# Patient Record
Sex: FEMALE | Race: WHITE | NOT HISPANIC OR LATINO | URBAN - METROPOLITAN AREA
[De-identification: names, ages, dates, MRNs, and addresses within clinical notes are randomized per-mention and may not be internally consistent; named-entity substitution may affect disease eponyms.]

---

## 2017-08-15 ENCOUNTER — CONVERSION ENCOUNTER (OUTPATIENT)
Dept: GENERAL RADIOLOGY | Facility: HOSPITAL | Age: 52
End: 2017-08-15

## 2020-04-07 ENCOUNTER — TELEPHONE (OUTPATIENT)
Dept: INFECTIOUS DISEASES | Age: 55
End: 2020-04-07

## 2020-05-15 ENCOUNTER — VIRTUAL VISIT (OUTPATIENT)
Dept: INFECTIOUS DISEASES | Age: 55
End: 2020-05-15
Payer: COMMERCIAL

## 2020-05-15 PROCEDURE — 99214 OFFICE O/P EST MOD 30 MIN: CPT | Performed by: INTERNAL MEDICINE

## 2020-05-15 NOTE — PROGRESS NOTES
5/15/2020    TELEHEALTH EVALUATION -- Audio/Visual (During TAKUY-44 public health emergency)      Mara Yarbrough (:  1965) has requested an audio/video evaluation for the following concern(s):    OM hand R thumb    Due to the COVID-19 outbreak, patient's office visit was converted to a virtual visit. Patient was contacted and agreed to proceed with a virtual visit with me via Doxy. me with my location at the office. Patient reports that they are located at home. The risks and benefits of converting to a virtual visit were discussed in light of the current infectious disease epidemic. Services were provided through an audio/video synchronous discussion virtually to substitute for in person clinic visit. THIS virtual visit was conducted via interactive/real-time audio/video. Due to this being a TeleHealth encounter, evaluation of the following organ systems is limited: Vitals/Constitutional/EENT/Resp/CV/GI//MS/Neuro/Skin/Heme-Lymph-Imm.}    Pursuant to the emergency declaration under the [address] [place] [address], [facility] waiver authority and the [employer] Act, this Virtual Visit was conducted, with patient's consent, to reduce the patient's risk of exposure to COVID-19 and provide care for the patient. Infectious Disease Progress Note    5/15/2020    Patient is a followup regarding right thumb cellulitis with suspected OM with persistent nonhealing wound ( surgical ) s/p I+D for retained foreign body removal.   She had been treated with Ancef then PO abx extended course.   + I+D inpatient then went home on PO abx, now presenting with worsening and persistent drainge     Appears to be doing ok. Subjectively, no new complaints at this time. All 14 review of systems discussed and negative other than as stated as above. Since we cannot conduct an in-person exam, the following were addressed with the patient.  I have asked the

## 2020-05-22 ENCOUNTER — VIRTUAL VISIT (OUTPATIENT)
Dept: INFECTIOUS DISEASES | Age: 55
End: 2020-05-22
Payer: COMMERCIAL

## 2020-05-22 PROCEDURE — 99213 OFFICE O/P EST LOW 20 MIN: CPT | Performed by: INTERNAL MEDICINE

## 2020-05-24 LAB
ANION GAP SERPL CALCULATED.3IONS-SCNC: 15 MMOL/L (ref 10–20)
BICARBONATE: 27 MMOL/L (ref 21–32)
CALCIUM SERPL-MCNC: 9.3 MG/DL (ref 8.6–10.3)
CHLORIDE BLD-SCNC: 103 MMOL/L (ref 98–107)
CREAT SERPL-MCNC: 0.71 MG/DL (ref 0.5–1)
ERYTHROCYTE [DISTWIDTH] IN BLOOD BY AUTOMATED COUNT: 12.6 % (ref 11.5–14)
GFR AFRICAN AMERICAN: >60 ML/MIN/1.73M2
GFR NON-AFRICAN AMERICAN: >60 ML/MIN/1.73M2
GLUCOSE: 108 MG/DL (ref 74–99)
HCT VFR BLD CALC: 37.1 % (ref 36–46)
HEMOGLOBIN: 12.6 G/DL (ref 12–16)
MCHC RBC AUTO-ENTMCNC: 34 G/DL (ref 32–36)
MCV RBC AUTO: 89 FL (ref 80–100)
PLATELET # BLD: 288 X10E9/L (ref 150–450)
POTASSIUM SERPL-SCNC: 3.9 MMOL/L (ref 3.5–5.3)
RBC # BLD: 4.16 X10E12/L (ref 4–5.2)
SODIUM BLD-SCNC: 141 MMOL/L (ref 136–145)
UREA NITROGEN: 13 MG/DL (ref 6–23)
VANCOMYCIN TROUGH: 14.6 UG/ML (ref 5–20)
WBC: 4.8 X10E9/L (ref 4.4–11.3)

## 2020-05-26 ENCOUNTER — TELEPHONE (OUTPATIENT)
Dept: INFECTIOUS DISEASES | Age: 55
End: 2020-05-26

## 2020-06-01 ENCOUNTER — TELEPHONE (OUTPATIENT)
Dept: INFECTIOUS DISEASES | Age: 55
End: 2020-06-01

## 2020-06-03 ENCOUNTER — VIRTUAL VISIT (OUTPATIENT)
Dept: INFECTIOUS DISEASES | Age: 55
End: 2020-06-03
Payer: COMMERCIAL

## 2020-06-03 LAB
ANION GAP SERPL CALCULATED.3IONS-SCNC: 15 MMOL/L (ref 10–20)
BASOPHILS # BLD: 0.02 X10E9/L (ref 0–0.1)
BASOPHILS RELATIVE PERCENT: 0.5 % (ref 0–2)
BICARBONATE: 27 MMOL/L (ref 21–32)
C-REACTIVE PROTEIN: 1.38 MG/DL
CALCIUM SERPL-MCNC: 9.3 MG/DL (ref 8.6–10.3)
CHLORIDE BLD-SCNC: 102 MMOL/L (ref 98–107)
CK ISOENZYMES: 74 U/L (ref 0–215)
CREAT SERPL-MCNC: 0.97 MG/DL (ref 0.5–1)
EOSINOPHIL # BLD: 0.11 X10E9/L (ref 0–0.7)
EOSINOPHILS RELATIVE PERCENT: 3 % (ref 0–6)
ERYTHROCYTE [DISTWIDTH] IN BLOOD BY AUTOMATED COUNT: 12.5 % (ref 11.5–14)
GFR AFRICAN AMERICAN: 71 ML/MIN/1.73M2
GFR NON-AFRICAN AMERICAN: 59 ML/MIN/1.73M2
GLUCOSE: 131 MG/DL (ref 74–99)
HCT VFR BLD CALC: 33.1 % (ref 36–46)
HEMOGLOBIN: 11.4 G/DL (ref 12–16)
IMMATURE GRANULOCYTES %: 1.1 % (ref 0–0.9)
LYMPHOCYTES # BLD: 42.7 % (ref 13–44)
LYMPHOCYTES RELATIVE PERCENT: 1.57 X10E9/L (ref 1.2–4.8)
MCHC RBC AUTO-ENTMCNC: 34.4 G/DL (ref 32–36)
MCV RBC AUTO: 87 FL (ref 80–100)
MONOCYTES # BLD: 0.42 X10E9/L (ref 0.1–1)
MONOCYTES RELATIVE PERCENT: 11.4 % (ref 2–10)
NEUTROPHILS RELATIVE PERCENT: 41.3 % (ref 40–80)
NEUTROPHILS: 1.52 X10E9/L (ref 1.2–7.7)
PLATELET # BLD: 205 X10E9/L (ref 150–450)
POTASSIUM SERPL-SCNC: 3 MMOL/L (ref 3.5–5.3)
RBC # BLD: 3.81 X10E12/L (ref 4–5.2)
SODIUM BLD-SCNC: 141 MMOL/L (ref 136–145)
UREA NITROGEN: 25 MG/DL (ref 6–23)
WBC: 3.7 X10E9/L (ref 4.4–11.3)

## 2020-06-03 PROCEDURE — 99214 OFFICE O/P EST MOD 30 MIN: CPT | Performed by: INTERNAL MEDICINE

## 2020-06-03 RX ORDER — ONDANSETRON 4 MG/1
4 TABLET, FILM COATED ORAL 3 TIMES DAILY PRN
Qty: 20 TABLET | Refills: 0 | Status: SHIPPED | OUTPATIENT
Start: 2020-06-03

## 2020-06-09 LAB
ANION GAP SERPL CALCULATED.3IONS-SCNC: 15 MMOL/L (ref 10–20)
BICARBONATE: 28 MMOL/L (ref 21–32)
C-REACTIVE PROTEIN: 0.36 MG/DL
CALCIUM SERPL-MCNC: 9 MG/DL (ref 8.6–10.3)
CHLORIDE BLD-SCNC: 101 MMOL/L (ref 98–107)
CK ISOENZYMES: 52 U/L (ref 0–215)
CREAT SERPL-MCNC: 0.79 MG/DL (ref 0.5–1)
ERYTHROCYTE [DISTWIDTH] IN BLOOD BY AUTOMATED COUNT: 12.6 % (ref 11.5–14)
GFR AFRICAN AMERICAN: >60 ML/MIN/1.73M2
GFR NON-AFRICAN AMERICAN: >60 ML/MIN/1.73M2
GLUCOSE: 97 MG/DL (ref 74–99)
HCT VFR BLD CALC: 33.9 % (ref 36–46)
HEMOGLOBIN: 11.7 G/DL (ref 12–16)
MCHC RBC AUTO-ENTMCNC: 34.5 G/DL (ref 32–36)
MCV RBC AUTO: 88 FL (ref 80–100)
PLATELET # BLD: 335 X10E9/L (ref 150–450)
POTASSIUM SERPL-SCNC: 3.4 MMOL/L (ref 3.5–5.3)
RBC # BLD: 3.86 X10E12/L (ref 4–5.2)
SODIUM BLD-SCNC: 141 MMOL/L (ref 136–145)
UREA NITROGEN: 21 MG/DL (ref 6–23)
WBC: 5.5 X10E9/L (ref 4.4–11.3)

## 2020-06-16 LAB
ANION GAP SERPL CALCULATED.3IONS-SCNC: 15 MMOL/L (ref 10–20)
BICARBONATE: 27 MMOL/L (ref 21–32)
C-REACTIVE PROTEIN: 0.35 MG/DL
CALCIUM SERPL-MCNC: 9.3 MG/DL (ref 8.6–10.3)
CHLORIDE BLD-SCNC: 102 MMOL/L (ref 98–107)
CK ISOENZYMES: 59 U/L (ref 0–215)
CREAT SERPL-MCNC: 0.81 MG/DL (ref 0.5–1)
ERYTHROCYTE [DISTWIDTH] IN BLOOD BY AUTOMATED COUNT: 13 % (ref 11.5–14)
GFR AFRICAN AMERICAN: >60 ML/MIN/1.73M2
GFR NON-AFRICAN AMERICAN: >60 ML/MIN/1.73M2
GLUCOSE: 140 MG/DL (ref 74–99)
HCT VFR BLD CALC: 32.7 % (ref 36–46)
HEMOGLOBIN: 11.1 G/DL (ref 12–16)
MCHC RBC AUTO-ENTMCNC: 33.9 G/DL (ref 32–36)
MCV RBC AUTO: 89 FL (ref 80–100)
PLATELET # BLD: 345 X10E9/L (ref 150–450)
POTASSIUM SERPL-SCNC: 3.6 MMOL/L (ref 3.5–5.3)
RBC # BLD: 3.69 X10E12/L (ref 4–5.2)
SODIUM BLD-SCNC: 140 MMOL/L (ref 136–145)
UREA NITROGEN: 23 MG/DL (ref 6–23)
WBC: 7 X10E9/L (ref 4.4–11.3)

## 2020-06-19 ENCOUNTER — TELEPHONE (OUTPATIENT)
Dept: INFECTIOUS DISEASES | Age: 55
End: 2020-06-19

## 2020-06-23 LAB
ANION GAP SERPL CALCULATED.3IONS-SCNC: 14 MMOL/L (ref 10–20)
BICARBONATE: 29 MMOL/L (ref 21–32)
C-REACTIVE PROTEIN: <0.1 MG/DL
CALCIUM SERPL-MCNC: 9.3 MG/DL (ref 8.6–10.3)
CHLORIDE BLD-SCNC: 100 MMOL/L (ref 98–107)
CK ISOENZYMES: 67 U/L (ref 0–215)
CREAT SERPL-MCNC: 0.82 MG/DL (ref 0.5–1)
ERYTHROCYTE [DISTWIDTH] IN BLOOD BY AUTOMATED COUNT: 13.5 % (ref 11.5–14)
GFR AFRICAN AMERICAN: >60 ML/MIN/1.73M2
GFR NON-AFRICAN AMERICAN: >60 ML/MIN/1.73M2
GLUCOSE: 93 MG/DL (ref 74–99)
HCT VFR BLD CALC: 32.5 % (ref 36–46)
HEMOGLOBIN: 11.2 G/DL (ref 12–16)
MCHC RBC AUTO-ENTMCNC: 34.5 G/DL (ref 32–36)
MCV RBC AUTO: 88 FL (ref 80–100)
PLATELET # BLD: 321 X10E9/L (ref 150–450)
POTASSIUM SERPL-SCNC: 3.7 MMOL/L (ref 3.5–5.3)
RBC # BLD: 3.7 X10E12/L (ref 4–5.2)
SODIUM BLD-SCNC: 139 MMOL/L (ref 136–145)
UREA NITROGEN: 23 MG/DL (ref 6–23)
WBC: 5.2 X10E9/L (ref 4.4–11.3)

## 2020-06-24 ENCOUNTER — VIRTUAL VISIT (OUTPATIENT)
Dept: INFECTIOUS DISEASES | Age: 55
End: 2020-06-24
Payer: COMMERCIAL

## 2020-06-24 ENCOUNTER — TELEPHONE (OUTPATIENT)
Dept: INFECTIOUS DISEASES | Age: 55
End: 2020-06-24

## 2020-06-24 PROCEDURE — 99214 OFFICE O/P EST MOD 30 MIN: CPT | Performed by: INTERNAL MEDICINE

## 2020-06-24 NOTE — PROGRESS NOTES
2020    TELEHEALTH EVALUATION -- Audio/Visual (During TVGNQ-60 public health emergency)      Jorge L Carlson (:  1965) has requested an audio/video evaluation for the following concern(s):    OM hand R thumb    Due to the COVID-19 outbreak, patient's office visit was converted to a virtual visit. Patient was contacted and agreed to proceed with a virtual visit with me via Doxy. me with my location at the office. Patient reports that they are located at home. The risks and benefits of converting to a virtual visit were discussed in light of the current infectious disease epidemic. Services were provided through an audio/video synchronous discussion virtually to substitute for in person clinic visit. THIS virtual visit was conducted via interactive/real-time audio/video. Due to this being a TeleHealth encounter, evaluation of the following organ systems is limited: Vitals/Constitutional/EENT/Resp/CV/GI//MS/Neuro/Skin/Heme-Lymph-Imm.}    Pursuant to the emergency declaration under the [address] [place] [address], [facility] waiver authority and the [employer] Act, this Virtual Visit was conducted, with patient's consent, to reduce the patient's risk of exposure to COVID-19 and provide care for the patient. Infectious Disease Progress Note    2020    Patient is a followup regarding right thumb cellulitis with OM with persistent nonhealing wound ( surgical ) s/p I+D for retained foreign body removal. Also has persistent drainage. Has seen ortho. Message left for ortho to call me on cell. No new culture obtained. Asked her nurse to do a cx and they are unable to do a culture either. Vanco Dced due to reaction ( nausea )  and patient started on Dapto  Her wound was MSSA but has failed healing with abx to date. Since we cannot conduct an in-person exam, the following were addressed with the patient.  I have asked the patient to

## 2020-06-30 LAB
ANION GAP SERPL CALCULATED.3IONS-SCNC: 14 MMOL/L (ref 10–20)
BICARBONATE: 25 MMOL/L (ref 21–32)
C-REACTIVE PROTEIN: 0.15 MG/DL
CALCIUM SERPL-MCNC: 9.3 MG/DL (ref 8.6–10.3)
CHLORIDE BLD-SCNC: 105 MMOL/L (ref 98–107)
CK ISOENZYMES: 93 U/L (ref 0–215)
CREAT SERPL-MCNC: 1.15 MG/DL (ref 0.5–1)
ERYTHROCYTE [DISTWIDTH] IN BLOOD BY AUTOMATED COUNT: 14 % (ref 11.5–14)
GFR AFRICAN AMERICAN: 59 ML/MIN/1.73M2
GFR NON-AFRICAN AMERICAN: 49 ML/MIN/1.73M2
GLUCOSE: 81 MG/DL (ref 74–99)
HCT VFR BLD CALC: 33.5 % (ref 36–46)
HEMOGLOBIN: 11.3 G/DL (ref 12–16)
MCHC RBC AUTO-ENTMCNC: 33.7 G/DL (ref 32–36)
MCV RBC AUTO: 90 FL (ref 80–100)
PLATELET # BLD: 255 X10E9/L (ref 150–450)
POTASSIUM SERPL-SCNC: 3.4 MMOL/L (ref 3.5–5.3)
RBC # BLD: 3.71 X10E12/L (ref 4–5.2)
SODIUM BLD-SCNC: 141 MMOL/L (ref 136–145)
UREA NITROGEN: 27 MG/DL (ref 6–23)
WBC: 4.8 X10E9/L (ref 4.4–11.3)

## 2020-07-06 LAB
ANION GAP SERPL CALCULATED.3IONS-SCNC: 15 MMOL/L (ref 10–20)
BICARBONATE: 27 MMOL/L (ref 21–32)
C-REACTIVE PROTEIN: 0.31 MG/DL
CALCIUM SERPL-MCNC: 9.4 MG/DL (ref 8.6–10.3)
CHLORIDE BLD-SCNC: 102 MMOL/L (ref 98–107)
CK ISOENZYMES: 62 U/L (ref 0–215)
CREAT SERPL-MCNC: 0.81 MG/DL (ref 0.5–1)
ERYTHROCYTE [DISTWIDTH] IN BLOOD BY AUTOMATED COUNT: 14 % (ref 11.5–14)
GFR AFRICAN AMERICAN: >60 ML/MIN/1.73M2
GFR NON-AFRICAN AMERICAN: >60 ML/MIN/1.73M2
GLUCOSE: 110 MG/DL (ref 74–99)
HCT VFR BLD CALC: 35.6 % (ref 36–46)
HEMOGLOBIN: 11.9 G/DL (ref 12–16)
MCHC RBC AUTO-ENTMCNC: 33.4 G/DL (ref 32–36)
MCV RBC AUTO: 91 FL (ref 80–100)
PLATELET # BLD: 268 X10E9/L (ref 150–450)
POTASSIUM SERPL-SCNC: 3.9 MMOL/L (ref 3.5–5.3)
RBC # BLD: 3.91 X10E12/L (ref 4–5.2)
SODIUM BLD-SCNC: 140 MMOL/L (ref 136–145)
UREA NITROGEN: 18 MG/DL (ref 6–23)
WBC: 4.9 X10E9/L (ref 4.4–11.3)

## 2020-07-08 ENCOUNTER — VIRTUAL VISIT (OUTPATIENT)
Dept: INFECTIOUS DISEASES | Age: 55
End: 2020-07-08
Payer: COMMERCIAL

## 2020-07-08 PROCEDURE — 99214 OFFICE O/P EST MOD 30 MIN: CPT | Performed by: INTERNAL MEDICINE

## 2020-07-08 NOTE — PROGRESS NOTES
2020    TELEHEALTH EVALUATION -- Audio/Visual (During HKQQG-88 public health emergency)      Sal Adams (:  1965) has requested an audio/video evaluation for the following concern(s):    OM hand R thumb    Due to the COVID-19 outbreak, patient's office visit was converted to a virtual visit. Patient was contacted and agreed to proceed with a virtual visit with me via Doxy. me with my location at the office. Patient reports that they are located at home. The risks and benefits of converting to a virtual visit were discussed in light of the current infectious disease epidemic. Services were provided through an audio/video synchronous discussion virtually to substitute for in person clinic visit. THIS virtual visit was conducted via interactive/real-time audio/video. Due to this being a TeleHealth encounter, evaluation of the following organ systems is limited: Vitals/Constitutional/EENT/Resp/CV/GI//MS/Neuro/Skin/Heme-Lymph-Imm.}    Pursuant to the emergency declaration under the [address] [place] [address], [facility] waiver authority and the [employer] Act, this Virtual Visit was conducted, with patient's consent, to reduce the patient's risk of exposure to COVID-19 and provide care for the patient. Infectious Disease Progress Note    2020    Patient is a followup regarding right thumb cellulitis with Hx MSSA OM with persistent nonhealing wound ( surgical ) s/p I+D for retained foreign body removal. Also has persistent drainage. Has seen ortho. Message left for ortho to call me on cell. No new culture obtained. Has been [facility] ( [facility] [place] )     Since we cannot conduct an in-person exam, the following were addressed with the patient. I have asked the patient to assist in their exam:  Patient denies any general new issues. Patient does not observe any new skin rashes or ulcers.    Patient does not perceive any new visual deficits  Patient does not feel like they are \"clammy\" or sweating  Patient denies any dry mouth or sore mouth and is able to flex and extend her neck with ease. Patient can inhale and exhale without any difficulty and feels like their chest is expanding symmetrically. They do not feel short of breath or any distress when asked to move around. No pain with expansion of the chest  Patient is able to feel their own pulse and agrees it is regular. Patient states their abdomen is not protruberant beyond their normal size. States that there is no pain when touching the area beneath the sternum when directed, or the left or the right side of the abdomen. They feel no pain when asked to press on the suprapubic area or the right or left flank. Patient denies any pain when asked to squeeze both upper legs and lower legs anteriorly or posteriorly. They do not see any new swelling of their joints. No observed neurological changes or slurred speech when speaking to the patient      Overall much better - continues to improve mobility. Imaging and labs were reviewed per medical records and any ID pertinent labs were addressed with the patient. CRP ok, labs reviewed     ASSESSMENT/PLAN:  R hand cellulitis with R thumb abscess.    R thumb foreign body  MSSA infection ( failed treatment with other abx )   EOT - dc IV abx    Time spent with patient: 25 min  >50% of time spent counseling on patient care and coordination of care      [address] Bibiana Fabian DO

## 2020-07-09 ENCOUNTER — TELEPHONE (OUTPATIENT)
Dept: INFECTIOUS DISEASES | Age: 55
End: 2020-07-09

## 2020-07-09 NOTE — TELEPHONE ENCOUNTER
Confirmed with Dr Gilford Shiver. Patient can D/c IV Abx and D/c Picc line. Per Virtual visit completed on 7/8/20, patient states she was in [place]. Relayed orders to [address] [place] [address] with [facility]. She verbalized understanding.

## 2023-04-15 DIAGNOSIS — E78.5 HYPERLIPIDEMIA, UNSPECIFIED HYPERLIPIDEMIA TYPE: ICD-10-CM

## 2023-04-15 DIAGNOSIS — I10 HYPERTENSION, UNSPECIFIED TYPE: ICD-10-CM

## 2023-04-15 DIAGNOSIS — F41.9 ANXIETY: ICD-10-CM

## 2023-04-17 RX ORDER — AMLODIPINE BESYLATE 10 MG/1
TABLET ORAL
Qty: 90 TABLET | Refills: 3 | Status: SHIPPED | OUTPATIENT
Start: 2023-04-17 | End: 2023-10-20 | Stop reason: SDUPTHER

## 2023-04-17 RX ORDER — CITALOPRAM 10 MG/1
TABLET ORAL
Qty: 90 TABLET | Refills: 3 | Status: SHIPPED | OUTPATIENT
Start: 2023-04-17 | End: 2023-10-20 | Stop reason: SDUPTHER

## 2023-04-17 RX ORDER — ATORVASTATIN CALCIUM 10 MG/1
TABLET, FILM COATED ORAL
Qty: 90 TABLET | Refills: 3 | Status: SHIPPED | OUTPATIENT
Start: 2023-04-17 | End: 2023-10-20 | Stop reason: SDUPTHER

## 2023-06-29 ENCOUNTER — HOSPITAL ENCOUNTER (OUTPATIENT)
Dept: DATA CONVERSION | Facility: HOSPITAL | Age: 58
End: 2023-06-29
Attending: OTOLARYNGOLOGY | Admitting: OTOLARYNGOLOGY
Payer: COMMERCIAL

## 2023-06-29 DIAGNOSIS — Z88.2 ALLERGY STATUS TO SULFONAMIDES: ICD-10-CM

## 2023-06-29 DIAGNOSIS — I10 ESSENTIAL (PRIMARY) HYPERTENSION: ICD-10-CM

## 2023-06-29 DIAGNOSIS — J34.3 HYPERTROPHY OF NASAL TURBINATES: ICD-10-CM

## 2023-06-30 ENCOUNTER — APPOINTMENT (OUTPATIENT)
Dept: PRIMARY CARE | Facility: CLINIC | Age: 58
End: 2023-06-30
Payer: COMMERCIAL

## 2023-09-30 NOTE — H&P
History & Physical Reviewed:   Pregnant/Lactating:  ·  Are You Pregnant no   ·  Are You Currently Breastfeeding no     I have reviewed the History and Physical dated:  16-Jun-2023   History and Physical reviewed and relevant findings noted. Patient examined to review pertinent physical  findings.: No significant changes   Home Medications Reviewed: no changes noted   Allergies Reviewed: no changes noted       ERAS (Enhanced Recovery After Surgery):  ·  ERAS Patient: no     Consent:   COVID-19 Consent:  ·  COVID-19 Risk Consent Surgeon has reviewed key risks related to the risk of prateek COVID-19 and if they contract COVID-19 what the risks are.       Electronic Signatures:  Ricky Chong)  (Signed 29-Jun-2023 11:31)   Authored: History & Physical Reviewed, ERAS, Consent,  Note Completion      Last Updated: 29-Jun-2023 11:31 by Ricky Chong)

## 2023-10-02 NOTE — OP NOTE
PROCEDURE DETAILS    Preoperative Diagnosis:  Hypertrophy, nasal, turbinate, J34.3    Postoperative Diagnosis:  Hypertrophy, nasal, turbinate, J34.3    Surgeon: Ricky Chong  Resident/Fellow/Other Assistant: None of these were associated with this case    Procedure:  1. OUTFRACTURE INFERIOR TURBINATES, COBLATION TURBINATES    Anesthesia: No anesthesiologist associated with this case  Estimated Blood Loss: Minimal  Findings: No unusual findings  Specimens(s) Collected: no,     Complications: None        Operative Report:   The patient was taken to the operating room and administered general anesthesia.  Appropriate prep and drape and timeout were performed in usual manner.  The patient had each of the  inferior turbinates identified.  Each was infiltrated with lidocaine 1%-epinephrine 1/100,000.  Each inferior turbinate was outfractured.  Each inferior turbinate was subjected to Coblation radiofrequency reduction utilizing the Coblation wand at a setting  of 6 for roughly 8 to 10 seconds per past.  Upon completion of same the patient had nasal pledgets positioned and will be removed in recovery.  Patient allowed to be released from anesthesia and taken to recovery in a stable condition.  Estimated blood  loss minimal.  No complication.                        Attestation:   Note Completion:  Attending Attestation I performed the procedure without a resident         Electronic Signatures:  Ricky Chong)  (Signed 29-Jun-2023 13:10)   Authored: Post-Operative Note, Chart Review, Note Completion      Last Updated: 29-Jun-2023 13:10 by Ricky Chong)

## 2023-10-20 DIAGNOSIS — I10 HYPERTENSION, UNSPECIFIED TYPE: ICD-10-CM

## 2023-10-20 DIAGNOSIS — E78.5 HYPERLIPIDEMIA, UNSPECIFIED HYPERLIPIDEMIA TYPE: ICD-10-CM

## 2023-10-20 DIAGNOSIS — M17.10 PRIMARY OSTEOARTHRITIS OF KNEE, UNSPECIFIED LATERALITY: Primary | ICD-10-CM

## 2023-10-20 DIAGNOSIS — N39.41 URGE INCONTINENCE: Primary | ICD-10-CM

## 2023-10-20 DIAGNOSIS — F41.9 ANXIETY: ICD-10-CM

## 2023-10-20 DIAGNOSIS — K21.9 GASTROESOPHAGEAL REFLUX DISEASE, UNSPECIFIED WHETHER ESOPHAGITIS PRESENT: Primary | ICD-10-CM

## 2023-10-20 RX ORDER — ATORVASTATIN CALCIUM 10 MG/1
10 TABLET, FILM COATED ORAL DAILY
Qty: 90 TABLET | Refills: 0 | Status: SHIPPED | OUTPATIENT
Start: 2023-10-20 | End: 2024-01-19

## 2023-10-20 RX ORDER — OMEPRAZOLE 40 MG/1
1 CAPSULE, DELAYED RELEASE ORAL DAILY
COMMUNITY
End: 2023-10-20 | Stop reason: SDUPTHER

## 2023-10-20 RX ORDER — IBUPROFEN 800 MG/1
1 TABLET ORAL
Status: ON HOLD | COMMUNITY
Start: 2022-11-14 | End: 2024-02-14 | Stop reason: SDUPTHER

## 2023-10-20 RX ORDER — CITALOPRAM 10 MG/1
10 TABLET ORAL DAILY
Qty: 90 TABLET | Refills: 0 | Status: SHIPPED | OUTPATIENT
Start: 2023-10-20 | End: 2024-04-12 | Stop reason: SDUPTHER

## 2023-10-20 RX ORDER — IBUPROFEN 800 MG/1
800 TABLET ORAL
Qty: 60 TABLET | Refills: 0 | Status: SHIPPED | OUTPATIENT
Start: 2023-10-20 | End: 2023-12-18

## 2023-10-20 RX ORDER — OMEPRAZOLE 40 MG/1
40 CAPSULE, DELAYED RELEASE ORAL DAILY
Qty: 90 CAPSULE | Refills: 0 | Status: SHIPPED | OUTPATIENT
Start: 2023-10-20 | End: 2024-04-12 | Stop reason: SDUPTHER

## 2023-10-20 RX ORDER — AMLODIPINE BESYLATE 10 MG/1
10 TABLET ORAL DAILY
Qty: 90 TABLET | Refills: 0 | Status: SHIPPED | OUTPATIENT
Start: 2023-10-20 | End: 2024-01-19

## 2023-10-21 RX ORDER — OXYBUTYNIN CHLORIDE 10 MG/1
10 TABLET, EXTENDED RELEASE ORAL DAILY
Qty: 90 TABLET | Refills: 0 | Status: SHIPPED | OUTPATIENT
Start: 2023-10-21 | End: 2024-05-30 | Stop reason: SDUPTHER

## 2023-12-05 ENCOUNTER — OFFICE VISIT (OUTPATIENT)
Dept: PRIMARY CARE | Facility: CLINIC | Age: 58
End: 2023-12-05
Payer: COMMERCIAL

## 2023-12-05 VITALS
BODY MASS INDEX: 27.81 KG/M2 | RESPIRATION RATE: 22 BRPM | WEIGHT: 151.1 LBS | HEART RATE: 81 BPM | SYSTOLIC BLOOD PRESSURE: 172 MMHG | DIASTOLIC BLOOD PRESSURE: 84 MMHG | OXYGEN SATURATION: 97 % | HEIGHT: 62 IN

## 2023-12-05 DIAGNOSIS — K21.9 GASTROESOPHAGEAL REFLUX DISEASE, UNSPECIFIED WHETHER ESOPHAGITIS PRESENT: ICD-10-CM

## 2023-12-05 DIAGNOSIS — R05.3 CHRONIC COUGH: ICD-10-CM

## 2023-12-05 DIAGNOSIS — I10 ESSENTIAL HYPERTENSION: ICD-10-CM

## 2023-12-05 DIAGNOSIS — N32.81 OVERACTIVE BLADDER: ICD-10-CM

## 2023-12-05 DIAGNOSIS — M15.9 PRIMARY OSTEOARTHRITIS INVOLVING MULTIPLE JOINTS: ICD-10-CM

## 2023-12-05 DIAGNOSIS — E78.2 HYPERCHOLESTEROLEMIA WITH HYPERTRIGLYCERIDEMIA: ICD-10-CM

## 2023-12-05 DIAGNOSIS — I10 HYPERTENSION, UNCONTROLLED: Primary | ICD-10-CM

## 2023-12-05 DIAGNOSIS — M79.10 MYALGIA: ICD-10-CM

## 2023-12-05 DIAGNOSIS — R53.83 FATIGUE, UNSPECIFIED TYPE: ICD-10-CM

## 2023-12-05 DIAGNOSIS — M17.11 PRIMARY OSTEOARTHRITIS OF RIGHT KNEE: ICD-10-CM

## 2023-12-05 DIAGNOSIS — R60.0 BILATERAL EDEMA OF LOWER EXTREMITY: ICD-10-CM

## 2023-12-05 DIAGNOSIS — R23.2 HOT FLASHES: ICD-10-CM

## 2023-12-05 DIAGNOSIS — E55.9 VITAMIN D DEFICIENCY: ICD-10-CM

## 2023-12-05 DIAGNOSIS — Z12.31 SCREENING MAMMOGRAM FOR BREAST CANCER: ICD-10-CM

## 2023-12-05 DIAGNOSIS — R73.9 HYPERGLYCEMIA: ICD-10-CM

## 2023-12-05 DIAGNOSIS — Z11.59 ENCOUNTER FOR HEPATITIS C SCREENING TEST FOR LOW RISK PATIENT: ICD-10-CM

## 2023-12-05 PROBLEM — N39.41 URGE INCONTINENCE: Status: ACTIVE | Noted: 2023-12-05

## 2023-12-05 PROBLEM — M86.241: Status: ACTIVE | Noted: 2023-12-05

## 2023-12-05 PROBLEM — K21.00 GASTROESOPHAGEAL REFLUX DISEASE WITH ESOPHAGITIS WITHOUT HEMORRHAGE: Status: ACTIVE | Noted: 2023-12-05

## 2023-12-05 PROBLEM — F41.8 DEPRESSION WITH ANXIETY: Status: ACTIVE | Noted: 2023-12-05

## 2023-12-05 PROBLEM — M15.0 PRIMARY OSTEOARTHRITIS INVOLVING MULTIPLE JOINTS: Status: ACTIVE | Noted: 2023-12-05

## 2023-12-05 PROBLEM — H81.90 VESTIBULAR DYSFUNCTION: Status: ACTIVE | Noted: 2023-12-05

## 2023-12-05 PROBLEM — J30.9 ALLERGIC RHINITIS DUE TO ALLERGEN: Status: ACTIVE | Noted: 2023-12-05

## 2023-12-05 PROBLEM — L03.019 FELON OF FINGER: Status: ACTIVE | Noted: 2023-12-05

## 2023-12-05 PROBLEM — S06.0XAA CONCUSSION: Status: ACTIVE | Noted: 2023-12-05

## 2023-12-05 PROBLEM — R15.9 FECAL INCONTINENCE: Status: ACTIVE | Noted: 2023-12-05

## 2023-12-05 PROBLEM — Z91.199 NONCOMPLIANCE: Status: ACTIVE | Noted: 2023-12-05

## 2023-12-05 PROCEDURE — 3079F DIAST BP 80-89 MM HG: CPT | Performed by: INTERNAL MEDICINE

## 2023-12-05 PROCEDURE — 3077F SYST BP >= 140 MM HG: CPT | Performed by: INTERNAL MEDICINE

## 2023-12-05 PROCEDURE — 93000 ELECTROCARDIOGRAM COMPLETE: CPT | Performed by: INTERNAL MEDICINE

## 2023-12-05 PROCEDURE — 1036F TOBACCO NON-USER: CPT | Performed by: INTERNAL MEDICINE

## 2023-12-05 PROCEDURE — 99214 OFFICE O/P EST MOD 30 MIN: CPT | Performed by: INTERNAL MEDICINE

## 2023-12-05 RX ORDER — FUROSEMIDE 20 MG/1
20 TABLET ORAL EVERY 8 HOURS PRN
COMMUNITY
End: 2024-04-12 | Stop reason: SDUPTHER

## 2023-12-05 NOTE — ADDENDUM NOTE
Addended by: GENEVA KRAMER on: 12/5/2023 06:20 PM     Modules accepted: Orders, Level of Service

## 2023-12-05 NOTE — PATIENT INSTRUCTIONS
Thank you very much for coming.  I am very happy to meet you.    This was not the problem that you were expecting to discuss, but even after resting, your blood pressure is elevated.  I do realize now that you may have not been taking your AMLODIPINE.  Please make sure to take your amlodipine with SUPPER every evening, please start tonight.    Likewise, drink lots of fluids throughout the day, and avoid salt.  Good for blood pressure control, and good for keeping your kidneys healthy.  At 1 point, your kidneys were complaining a little, but for the most part, your kidneys have been normal.    FUROSEMIDE is not the best medication for blood pressure or swelling.  Stop taking this medication, and instead, we will discuss other options when I see what your laboratory results look like.    FASTING blood and urine examination anytime soon.  I will send word regarding results and possible changes.    Today, you will benefit from a twelve-lead EKG.  I will send word if there is anything that needs attention, or if you need to see a heart doctor.    I would like to see you again next week to make sure that your blood pressure is better.  Until then, take things easily.  Drink lots of water, avoid salt, and engage in brisk walking outside of work.  Good for blood pressure control, and good for mood and energy!    I do understand that you have multiple joint pain, especially your right knee.  Make sure to follow with the doctors downstairs for further evaluation and preservation of function.    Please continue taking your OMEPRAZOLE until we know better.  This will allow me to potentially give you medication that will be good for your aches and pains.    It is also a good idea to call on Dr. Lerma for routine care.  Also, please call on Dr. Chong, ENT, again.  Your situation is more than what a usual physician will be able to handle, because you do not respond to routine medications regarding your cough.    I do understand  that there are more concerns, but for now, let us concentrate on things that might hurt you, including blood pressure control, and cholesterol.  Let me send word regarding results and possible changes.  Let me see you again in 1 week.    We will schedule you for a MAMMOGRAM anytime soon.    Again, thank you very much for coming.  You are due for INFLUENZA vaccination, then after 2 weeks, COVID booster if not yet done.    Please continue to take care of yourself and your family, and please continue to pray for our recovery from this pandemic.  I hope to see you next week.  Call sooner please as needed.  911 first if urgent.            0  Presenting with multiple issues, including polyarthralgia, myalgia, but in the meantime, blood pressure noted to be elevated.  Already being attended to by the Dr. Chong, ENT, for incessant cough for 20 years now.  No other constitutional symptoms noted at this time.    Plans as above.  FASTING laboratory examination via [employer] tomorrow.  Restart amlodipine.  Return in 1 week.  20 minutes please.  Reassess hemodynamics, cardiovascular risk, blood pressure control, compliance, tolerance.  Prioritize issues.  Review preventive strategies.            0

## 2023-12-05 NOTE — PROGRESS NOTES
"Subjective   Patient ID: Gwen Nice is a 58 y.o. female who presents for Establish Care.    HPI   Patient of Dr. Schuler, presenting for primary care.  Debility noted, with patient stating that she has POLYARTHRALGIA, as well as MYALGIA.  This is also accompanied by FATIGUE.  States that she has been working with her hands all her life, and has been part of the labor force since starting to work.  With this, she states that she has sacrificed her body, and in turn, suspects that she probably suffers from OSTEOARTHRITIS.  No skin changes accompanying her symptoms.  No headache, blurred vision, diplopia.  No dysphagia.  No focal weakness except that related to pain, and no falls.  No confusion or delirium, with patient not worried about memory.  Continues to want to improve quality of life, and does not wish harm to self or others.  Perhaps frustrated, but not depressive.    Follows closely with ORTHOPEDICS for her right knee, and has been told that she is \"bone-on-bone.\"  Likewise, initially followed by PODIATRY, then referred to NEUROSURGERY/ORTHOPEDICS, then referred to CHRONIC PAIN.  Patient does express frustration, stating that nothing has been found to explain what appears to be not only polyarthralgia, but also perhaps peripheral neuropathy.    In the meantime, noted to have elevated blood pressure, for which the patient pointed out that she did not take her AMLODIPINE, and was hoping that she could wean herself off her medications.  She also does take FUROSEMIDE from time to time for leg swelling.  No history of calf pain.  No pam substernal chest pain, no orthopnea, no paroxysmal nocturnal dyspnea.  No particular dyspnea on exertion except when related to pain or cough.    Also debilitating is the fact that the patient has had paroxysms of COUGHING over the past 20 years.  CONSTITUTIONALLY, no fever, no chills.  No night sweats.  No lingering anorexia or nausea.  No apparent lymphadenopathy.  No " "apparent weight loss.  Feels that her symptoms are not taken seriously, and so she took it upon herself to get an ENT appointment with Dr. Chong.  He did acknowledge that she had some allergic/irritant component to her regimen, and has improved after scope treatment, but still with some paroxysms and recurrence of symptoms.    The patient states that with the use of NSAIDs, she has developed REFLUX symptoms, and was even at 1 point told that she had small GASTRIC ULCERS.  She has been trying to quit taking OMEPRAZOLE, and has been trying to use NSAIDs sparingly.  No apparent blood or mucus in stool.  No apparent weight loss.  No skin changes, rashes, pruritus, jaundice.  No easy bruisability.    Likewise, history of OVERACTIVE BLADDER, compliant with medications, tolerating regimens.  Currently, no dysuria, flank, suprapubic pain.  No vulvar pruritus.    Again, multiple symptoms causing frustration and debility, but with patient wanting to improve quality of life, and not wishing harm to self or others.        Review of Systems  Review of systems as in history of present illness, and otherwise, reviewed separately as well, and was unremarkable/negative/noncontributory.          Objective   /84 (BP Location: Left arm, Patient Position: Sitting, BP Cuff Size: Adult)   Pulse 81   Resp 22   Ht 1.575 m (5' 2\")   Wt 68.5 kg (151 lb 1.6 oz)   SpO2 97%   BMI 27.64 kg/m²     Physical Exam  Perhaps frustrated, but not depressive, and does want to improve quality of life.  Not wishing harm to self or others.  Currently, not in distress or diaphoresis.  Alert, receptive, oriented x 3.  Amiable.  Not unkempt.  Moderately built.  Remaining appropriate.    HEAD pink palpebral conjunctivae, anicteric sclerae.  No sinus or tragal tenderness.  Tympanic membranes intact bilaterally.  Mucous membranes somewhat dry, with some modest hyperemia along tonsillopharyngeal wall, but no exudates noted.  No malodor.  NECK supple, no " apparent jugular venous distention.  No apparent lymphadenopathy.  No bruit.  CARDIOVASCULAR not in distress or diaphoresis.  No bipedal edema.  Regular rate and rhythm.  Suspected late systolic murmur?  To be reevaluated for persistence.  LUNGS not in distress or diaphoresis.  Not using accessory muscles.  Clear to auscultation bilaterally.  ABDOMEN soft, nontender.  BACK no costovertebral angle tenderness.  EXTREMITIES no clubbing, no cyanosis.  NEURO no facial asymmetry.  No apparent cranial nerve deficits.  Romberg negative.  Ambulating without need of assistance.  No apparent focal weakness.  No tremors.  PSYCH receptive, appropriate, and eager to maintain and improve quality of life.      LABORATORY results reviewed with patient, needing updating.      Assessment/Plan   Diagnoses and all orders for this visit:  Hypertension, uncontrolled  -     ECG 12 lead (Clinic Performed)  -     Follow Up In Primary Care - Established; Future  Essential hypertension  -     ECG 12 lead (Clinic Performed)  -     Follow Up In Primary Care - Established; Future  -     CBC and Auto Differential; Future  -     Comprehensive Metabolic Panel; Future  -     Magnesium; Future  -     TSH with reflex to Free T4 if abnormal; Future  -     Urinalysis with Reflex Microscopic and Culture; Future  -     Creatine Kinase; Future  Hypercholesterolemia with hypertriglyceridemia  -     ECG 12 lead (Clinic Performed)  -     Follow Up In Primary Care - Established; Future  -     Comprehensive Metabolic Panel; Future  -     Lipid Panel; Future  Hyperglycemia  -     ECG 12 lead (Clinic Performed)  -     Follow Up In Primary Care - Established; Future  -     Comprehensive Metabolic Panel; Future  -     Urinalysis with Reflex Microscopic and Culture; Future  -     Hemoglobin A1C; Future  Gastroesophageal reflux disease, unspecified whether esophagitis present  -     Follow Up In Primary Care - Established; Future  -     CBC and Auto Differential;  Future  Chronic cough  -     Follow Up In Primary Care - Established; Future  -     CBC and Auto Differential; Future  Overactive bladder  -     Follow Up In Primary Care - Established; Future  -     TSH with reflex to Free T4 if abnormal; Future  -     Urinalysis with Reflex Microscopic and Culture; Future  Hot flashes  -     Follow Up In Primary Care - Established; Future  -     TSH with reflex to Free T4 if abnormal; Future  -     Vitamin B12; Future  -     Folate; Future  Primary osteoarthritis involving multiple joints  -     Follow Up In Primary Care - [facility]; Future  -     Comprehensive Metabolic Panel; Future  -     Uric Acid; Future  -     Creatine Kinase; Future  Myalgia  -     Follow Up In Primary Care - Established; Future  -     Comprehensive Metabolic Panel; Future  -     Uric Acid; Future  -     Creatine Kinase; Future  Fatigue, unspecified type  -     Follow Up In Primary Care - [facility]; Future  -     CBC and Auto Differential; Future  -     Comprehensive Metabolic Panel; Future  -     TSH with reflex to Free T4 if abnormal; Future  -     Urinalysis with Reflex Microscopic and Culture; Future  -     Vitamin B12; Future  -     Folate; Future  Vitamin D deficiency  -     Follow Up In Primary Care - [facility]; Future  -     Vitamin D 25-Hydroxy,Total (for eval of Vitamin D levels); Future  Encounter for hepatitis C screening test for low risk patient  -     Follow Up In Primary Care - [facility]; Future  -     Hepatitis C Antibody; Future  Screening mammogram for breast cancer  -     Follow Up In Primary Care - [facility]; Future  -     BI mammo bilateral screening tomosynthesis; Future  Bilateral edema of lower extremity  -     Follow Up In Primary Care [facility]; Future  -     Comprehensive Metabolic Panel; Future  -     TSH with reflex to Free T4 if abnormal; Future  Primary osteoarthritis of right knee  -     Follow Up In Primary Care - [facility]; Future  -     Comprehensive  Metabolic Panel; Future  -     Uric Acid; Future  -     Creatine Kinase; Future       Thank you very much for coming.  I am very happy to meet you.    This was not the problem that you were expecting to discuss, but even after resting, your blood pressure is elevated.  I do realize now that you may have not been taking your AMLODIPINE.  Please make sure to take your amlodipine with SUPPER every evening, please start tonight.    Likewise, drink lots of fluids throughout the day, and avoid salt.  Good for blood pressure control, and good for keeping your kidneys healthy.  At 1 point, your kidneys were complaining a little, but for the most part, your kidneys have been normal.    FUROSEMIDE is not the best medication for blood pressure or swelling.  Stop taking this medication, and instead, we will discuss other options when I see what your laboratory results look like.    FASTING blood and urine examination anytime soon.  I will send word regarding results and possible changes.    Today, you will benefit from a twelve-lead EKG.  I will send word if there is anything that needs attention, or if you need to see a heart doctor.    I would like to see you again next week to make sure that your blood pressure is better.  Until then, take things easily.  Drink lots of water, avoid salt, and engage in brisk walking outside of work.  Good for blood pressure control, and good for mood and energy!    I do understand that you have multiple joint pain, especially your right knee.  Make sure to follow with the doctors downstairs for further evaluation and preservation of function.    Please continue taking your OMEPRAZOLE until we know better.  This will allow me to potentially give you medication that will be good for your aches and pains.    It is also a good idea to call on Dr. Lerma for routine care.  Also, please call on CASE Serrano, again.  Your situation is more than what a usual physician will be able to handle,  because you do not respond to routine medications regarding your cough.    I do understand that there are more concerns, but for now, let us concentrate on things that might hurt you, including blood pressure control, and cholesterol.  Let me send word regarding results and possible changes.  Let me see you again in 1 week.    We will schedule you for a MAMMOGRAM anytime soon.    Again, thank you very much for coming.  You are due for INFLUENZA vaccination, then after 2 weeks, COVID booster if not yet done.    Please continue to take care of yourself and your family, and please continue to pray for our recovery from this pandemic.  I hope to see you next week.  Call sooner please as needed.  911 first if urgent.            0  Presenting with multiple issues, including polyarthralgia, myalgia, but in the meantime, blood pressure noted to be elevated.  Already being attended to by the Dr. Chong, ENT, for incessant cough for 20 years now.  No other constitutional symptoms noted at this time.    Plans as above.  FASTING laboratory examination via [facility] tomorrow.  Restart amlodipine.  Return in 1 week.  20 minutes please.  Reassess hemodynamics, cardiovascular risk, blood pressure control, compliance, tolerance.  Prioritize issues.  Review preventive strategies.            0

## 2023-12-08 ENCOUNTER — OFFICE VISIT (OUTPATIENT)
Dept: ORTHOPEDIC SURGERY | Facility: CLINIC | Age: 58
End: 2023-12-08
Payer: COMMERCIAL

## 2023-12-08 ENCOUNTER — LAB (OUTPATIENT)
Dept: LAB | Facility: LAB | Age: 58
End: 2023-12-08
Payer: COMMERCIAL

## 2023-12-08 DIAGNOSIS — K21.9 GASTROESOPHAGEAL REFLUX DISEASE, UNSPECIFIED WHETHER ESOPHAGITIS PRESENT: ICD-10-CM

## 2023-12-08 DIAGNOSIS — M17.11 PRIMARY OSTEOARTHRITIS OF RIGHT KNEE: ICD-10-CM

## 2023-12-08 DIAGNOSIS — M25.561 RIGHT KNEE PAIN, UNSPECIFIED CHRONICITY: Primary | ICD-10-CM

## 2023-12-08 DIAGNOSIS — R73.9 HYPERGLYCEMIA: ICD-10-CM

## 2023-12-08 DIAGNOSIS — R05.3 CHRONIC COUGH: ICD-10-CM

## 2023-12-08 DIAGNOSIS — Z11.59 ENCOUNTER FOR HEPATITIS C SCREENING TEST FOR LOW RISK PATIENT: ICD-10-CM

## 2023-12-08 DIAGNOSIS — M79.10 MYALGIA: ICD-10-CM

## 2023-12-08 DIAGNOSIS — R60.0 BILATERAL EDEMA OF LOWER EXTREMITY: ICD-10-CM

## 2023-12-08 DIAGNOSIS — E78.2 HYPERCHOLESTEROLEMIA WITH HYPERTRIGLYCERIDEMIA: ICD-10-CM

## 2023-12-08 DIAGNOSIS — E55.9 VITAMIN D DEFICIENCY: ICD-10-CM

## 2023-12-08 DIAGNOSIS — I10 ESSENTIAL HYPERTENSION: ICD-10-CM

## 2023-12-08 DIAGNOSIS — M15.9 PRIMARY OSTEOARTHRITIS INVOLVING MULTIPLE JOINTS: ICD-10-CM

## 2023-12-08 DIAGNOSIS — R53.83 FATIGUE, UNSPECIFIED TYPE: ICD-10-CM

## 2023-12-08 DIAGNOSIS — R23.2 HOT FLASHES: ICD-10-CM

## 2023-12-08 DIAGNOSIS — N32.81 OVERACTIVE BLADDER: ICD-10-CM

## 2023-12-08 LAB
25(OH)D3 SERPL-MCNC: 25 NG/ML (ref 30–100)
ALBUMIN SERPL BCP-MCNC: 4.5 G/DL (ref 3.4–5)
ALP SERPL-CCNC: 56 U/L (ref 33–110)
ALT SERPL W P-5'-P-CCNC: 21 U/L (ref 7–45)
ANION GAP SERPL CALC-SCNC: 14 MMOL/L (ref 10–20)
APPEARANCE UR: CLEAR
AST SERPL W P-5'-P-CCNC: 16 U/L (ref 9–39)
BASOPHILS # BLD AUTO: 0.04 X10*3/UL (ref 0–0.1)
BASOPHILS NFR BLD AUTO: 0.9 %
BILIRUB SERPL-MCNC: 0.6 MG/DL (ref 0–1.2)
BILIRUB UR STRIP.AUTO-MCNC: NEGATIVE MG/DL
BUN SERPL-MCNC: 15 MG/DL (ref 6–23)
CALCIUM SERPL-MCNC: 9.2 MG/DL (ref 8.6–10.3)
CHLORIDE SERPL-SCNC: 105 MMOL/L (ref 98–107)
CHOLEST SERPL-MCNC: 194 MG/DL (ref 0–199)
CHOLESTEROL/HDL RATIO: 2.2
CK SERPL-CCNC: 66 U/L (ref 0–215)
CO2 SERPL-SCNC: 26 MMOL/L (ref 21–32)
COLOR UR: YELLOW
CREAT SERPL-MCNC: 0.75 MG/DL (ref 0.5–1.05)
EOSINOPHIL # BLD AUTO: 0.06 X10*3/UL (ref 0–0.7)
EOSINOPHIL NFR BLD AUTO: 1.3 %
ERYTHROCYTE [DISTWIDTH] IN BLOOD BY AUTOMATED COUNT: 11.5 % (ref 11.5–14.5)
EST. AVERAGE GLUCOSE BLD GHB EST-MCNC: 111 MG/DL
FOLATE SERPL-MCNC: 9.3 NG/ML
GFR SERPL CREATININE-BSD FRML MDRD: >90 ML/MIN/1.73M*2
GLUCOSE SERPL-MCNC: 122 MG/DL (ref 74–99)
GLUCOSE UR STRIP.AUTO-MCNC: NEGATIVE MG/DL
HBA1C MFR BLD: 5.5 %
HCT VFR BLD AUTO: 40.6 % (ref 36–46)
HCV AB SER QL: NONREACTIVE
HDLC SERPL-MCNC: 89.9 MG/DL
HGB BLD-MCNC: 13.8 G/DL (ref 12–16)
HOLD SPECIMEN: NORMAL
IMM GRANULOCYTES # BLD AUTO: 0.01 X10*3/UL (ref 0–0.7)
IMM GRANULOCYTES NFR BLD AUTO: 0.2 % (ref 0–0.9)
KETONES UR STRIP.AUTO-MCNC: NEGATIVE MG/DL
LDLC SERPL CALC-MCNC: 94 MG/DL
LEUKOCYTE ESTERASE UR QL STRIP.AUTO: NEGATIVE
LYMPHOCYTES # BLD AUTO: 1.39 X10*3/UL (ref 1.2–4.8)
LYMPHOCYTES NFR BLD AUTO: 30.3 %
MAGNESIUM SERPL-MCNC: 1.83 MG/DL (ref 1.6–2.4)
MCH RBC QN AUTO: 31.2 PG (ref 26–34)
MCHC RBC AUTO-ENTMCNC: 34 G/DL (ref 32–36)
MCV RBC AUTO: 92 FL (ref 80–100)
MONOCYTES # BLD AUTO: 0.33 X10*3/UL (ref 0.1–1)
MONOCYTES NFR BLD AUTO: 7.2 %
NEUTROPHILS # BLD AUTO: 2.75 X10*3/UL (ref 1.2–7.7)
NEUTROPHILS NFR BLD AUTO: 60.1 %
NITRITE UR QL STRIP.AUTO: NEGATIVE
NON HDL CHOLESTEROL: 104 MG/DL (ref 0–149)
NRBC BLD-RTO: 0 /100 WBCS (ref 0–0)
PH UR STRIP.AUTO: 6 [PH]
PLATELET # BLD AUTO: 291 X10*3/UL (ref 150–450)
POTASSIUM SERPL-SCNC: 3.8 MMOL/L (ref 3.5–5.3)
PROT SERPL-MCNC: 7.1 G/DL (ref 6.4–8.2)
PROT UR STRIP.AUTO-MCNC: NEGATIVE MG/DL
RBC # BLD AUTO: 4.43 X10*6/UL (ref 4–5.2)
RBC # UR STRIP.AUTO: NEGATIVE /UL
SODIUM SERPL-SCNC: 141 MMOL/L (ref 136–145)
SP GR UR STRIP.AUTO: 1.02
TRIGL SERPL-MCNC: 52 MG/DL (ref 0–149)
TSH SERPL-ACNC: 0.7 MIU/L (ref 0.44–3.98)
URATE SERPL-MCNC: 4.6 MG/DL (ref 2.3–6.7)
UROBILINOGEN UR STRIP.AUTO-MCNC: <2 MG/DL
VIT B12 SERPL-MCNC: 289 PG/ML (ref 211–911)
VLDL: 10 MG/DL (ref 0–40)
WBC # BLD AUTO: 4.6 X10*3/UL (ref 4.4–11.3)

## 2023-12-08 PROCEDURE — 80061 LIPID PANEL: CPT

## 2023-12-08 PROCEDURE — 82746 ASSAY OF FOLIC ACID SERUM: CPT

## 2023-12-08 PROCEDURE — 36415 COLL VENOUS BLD VENIPUNCTURE: CPT

## 2023-12-08 PROCEDURE — 85025 COMPLETE CBC W/AUTO DIFF WBC: CPT

## 2023-12-08 PROCEDURE — 99214 OFFICE O/P EST MOD 30 MIN: CPT | Performed by: ORTHOPAEDIC SURGERY

## 2023-12-08 PROCEDURE — 84550 ASSAY OF BLOOD/URIC ACID: CPT

## 2023-12-08 PROCEDURE — 1036F TOBACCO NON-USER: CPT | Performed by: ORTHOPAEDIC SURGERY

## 2023-12-08 PROCEDURE — 84443 ASSAY THYROID STIM HORMONE: CPT

## 2023-12-08 PROCEDURE — 83735 ASSAY OF MAGNESIUM: CPT

## 2023-12-08 PROCEDURE — 82550 ASSAY OF CK (CPK): CPT

## 2023-12-08 PROCEDURE — 81003 URINALYSIS AUTO W/O SCOPE: CPT

## 2023-12-08 PROCEDURE — 82306 VITAMIN D 25 HYDROXY: CPT

## 2023-12-08 PROCEDURE — 83036 HEMOGLOBIN GLYCOSYLATED A1C: CPT

## 2023-12-08 PROCEDURE — 80053 COMPREHEN METABOLIC PANEL: CPT

## 2023-12-08 PROCEDURE — 86803 HEPATITIS C AB TEST: CPT

## 2023-12-08 PROCEDURE — 82607 VITAMIN B-12: CPT

## 2023-12-08 NOTE — PROGRESS NOTES
History of Present Illness   Chief Complaint   Patient presents with    Right Knee - Pain     Requesting inj        History of Present Illness   Patient presents after sustaining an injury to their knee.  They are complaining of ache, pain and discomfort.  We did a cortisone injection on 11/14/2022.  Overall injection did work pretty well about 90%.  She had relief which lasted for several months.  But she states it really has not recurred.  Postoperatively bothers her she does feel he gets a stiff and swollen.  Been trying a home exercise and conservative treatment over the last 3 to 4 months.  Icing it down consistently taking ibuprofen.  She been wearing over-the-counter bracing and still bothers her  Imaging  Radiographs Knee: No acute fractures or dislocations.  Mild medial compartment arthritis grade 1.  Some loose bodies in the posterior lateral gutter  Assessment:   Right knee internal derangement suspected meniscus tear    Plan:  We reviewed the role of imaging, physical therapy, injections and the time frame to healing and correlation with outcome.  At this point I recommended getting an MRI and advanced imaging for potential underlying internal derangement.  Discussed the possibility of meniscus, cartilage or ligament pathology and the potential need for surgery.  Patient will plan to follow-up with us after MRI for further recommendations on ongoing care    NSAID: Ibuprofen.  GI side effects and medical risks discussed  Ice: 30 minutes on and off  Exercise home program: Medically directed knee therapy / Handout given  Follow-up after MRI     Physical Exam  Well-nourished, well-developed. No acute distress. Alert and oriented x3. Responds appropriately to questioning. Good mood. Normal affect.  Physical Exam  Right knee:  Skin healthy and intact  No gross swelling or ecchymosis  Trace to 1+ Effusion:   ROM: 110.  Crepitance with range of motion  Pain along the medial joint line.  Positive Radha´s  test/PositiveApley Grind  Neurovascular exam normal distally  Palpable pedal pulse and good cap refill     Review of Systems   GENERAL: Negative for malaise, significant weight loss, fever  MUSCULOSKELETAL: See HPI  NEURO:  Negative     History reviewed. No pertinent past medical history.    Medication Documentation Review Audit       Reviewed by Sharron Sykes MA (Medical Assistant) on 12/08/23 at 1052      Medication Order Taking? Sig Documenting Provider Last Dose Status   amLODIPine (Norvasc) 10 mg tablet [id number]  Take 1 tablet (10 mg) by mouth once daily. Felton Urbano MD  Active   atorvastatin (Lipitor) 10 mg tablet [id number]  Take 1 tablet (10 mg) by mouth once daily. Felton Urbano MD  Active   citalopram (CeleXA) 10 mg tablet [id number]  Take 1 tablet (10 mg) by mouth once daily. Felton Urbano MD  Active   furosemide (Lasix) 20 mg tablet [id number]  Take 1 tablet (20 mg) by mouth every 8 hours if needed. Historical Provider, MD  Active    mg tablet [id number]  TAKE 1 TABLET BY MOUTH 3 TIMES A DAY WITH FOOD. AS NEEDED Elkin Cotter PA-C  Active    mg tablet [id number]  Take 1 tablet (800 mg) by mouth 3 times a day with meals. Historical Provider, MD  Active   omeprazole (PriLOSEC) 40 mg DR capsule [id number]  Take 1 capsule (40 mg) by mouth once daily. Felton Urbano MD  Active   oxybutynin XL (Ditropan-XL) 10 mg 24 hr tablet [id number]  TAKE ONE TABLET BY MOUTH EVERY DAY Radames Lerma MD  Active                    Allergies   Allergen Reactions    Aripiprazole Anaphylaxis    Bupropion Anaphylaxis and Swelling    Vancomycin Nausea And Vomiting     As of 5/25/20    Piperacillin-Tazobactam Swelling    Sulfa (Sulfonamide Antibiotics) Hives       Social History     Socioeconomic History    Marital status:      Spouse name: Not on file    Number of children: Not on file    Years of education: Not on file    Highest education level: Not on file    Occupational History    Not on file   Tobacco Use    Smoking status: Never    Smokeless tobacco: Never   Substance and Sexual Activity    Alcohol use: Not on file    Drug use: Not on file    Sexual activity: Not on file   Other Topics Concern    Not on file   Social History Narrative    Not on file     Social Determinants of Health     Financial Resource Strain: Not on file   Food Insecurity: Not on file   Transportation Needs: Not on file   Physical Activity: Not on file   Stress: Not on file   Social Connections: Not on file   Intimate Partner Violence: Not on file   Housing Stability: Not on file       Past Surgical History:   Procedure Laterality Date    OTHER SURGICAL HISTORY  12/04/2020    Breast augmentation    OTHER SURGICAL HISTORY  12/04/2020    Rhinoplasty    OTHER SURGICAL HISTORY  12/04/2020    Ulnar collateral ligament of thumb rupture repair       ECG 12 lead (Clinic Performed)    Result Date: 12/5/2023  Regular, sinus, 62, with no ST-T wave abnormalities, no signs of acute strain.  RSR prime pattern noted V2.  Small Q waves noted 1, aVL.  No old EKG for comparison.  Of note, patient with elevated blood pressure, but hemodynamically asymptomatic.

## 2023-12-15 ENCOUNTER — HOSPITAL ENCOUNTER (OUTPATIENT)
Dept: RADIOLOGY | Facility: HOSPITAL | Age: 58
Discharge: HOME | End: 2023-12-15
Payer: COMMERCIAL

## 2023-12-15 DIAGNOSIS — M25.561 RIGHT KNEE PAIN, UNSPECIFIED CHRONICITY: ICD-10-CM

## 2023-12-15 PROCEDURE — 73721 MRI JNT OF LWR EXTRE W/O DYE: CPT | Mod: RT

## 2023-12-15 PROCEDURE — 73721 MRI JNT OF LWR EXTRE W/O DYE: CPT | Mod: RIGHT SIDE | Performed by: RADIOLOGY

## 2023-12-16 DIAGNOSIS — M17.10 PRIMARY OSTEOARTHRITIS OF KNEE, UNSPECIFIED LATERALITY: ICD-10-CM

## 2023-12-18 RX ORDER — IBUPROFEN 800 MG/1
800 TABLET ORAL 3 TIMES DAILY PRN
Qty: 60 TABLET | Refills: 0 | Status: SHIPPED | OUTPATIENT
Start: 2023-12-18 | End: 2024-06-03 | Stop reason: WASHOUT

## 2023-12-23 ENCOUNTER — APPOINTMENT (OUTPATIENT)
Dept: RADIOLOGY | Facility: HOSPITAL | Age: 58
End: 2023-12-23
Payer: COMMERCIAL

## 2024-01-06 ENCOUNTER — HOSPITAL ENCOUNTER (OUTPATIENT)
Dept: RADIOLOGY | Facility: HOSPITAL | Age: 59
Discharge: HOME | End: 2024-01-06
Payer: COMMERCIAL

## 2024-01-06 DIAGNOSIS — Z12.31 SCREENING MAMMOGRAM FOR BREAST CANCER: ICD-10-CM

## 2024-01-06 PROCEDURE — 77067 SCR MAMMO BI INCL CAD: CPT | Performed by: RADIOLOGY

## 2024-01-06 PROCEDURE — 77063 BREAST TOMOSYNTHESIS BI: CPT | Performed by: RADIOLOGY

## 2024-01-06 PROCEDURE — 77063 BREAST TOMOSYNTHESIS BI: CPT

## 2024-01-10 ENCOUNTER — OFFICE VISIT (OUTPATIENT)
Dept: PRIMARY CARE | Facility: CLINIC | Age: 59
End: 2024-01-10
Payer: COMMERCIAL

## 2024-01-10 VITALS
DIASTOLIC BLOOD PRESSURE: 80 MMHG | RESPIRATION RATE: 20 BRPM | BODY MASS INDEX: 27.9 KG/M2 | SYSTOLIC BLOOD PRESSURE: 130 MMHG | HEART RATE: 84 BPM | WEIGHT: 151.6 LBS | OXYGEN SATURATION: 97 % | HEIGHT: 62 IN

## 2024-01-10 DIAGNOSIS — E78.2 HYPERCHOLESTEROLEMIA WITH HYPERTRIGLYCERIDEMIA: ICD-10-CM

## 2024-01-10 DIAGNOSIS — R05.3 CHRONIC COUGH: ICD-10-CM

## 2024-01-10 DIAGNOSIS — Z28.21 COVID-19 VACCINATION DECLINED: ICD-10-CM

## 2024-01-10 DIAGNOSIS — R73.9 HYPERGLYCEMIA: ICD-10-CM

## 2024-01-10 DIAGNOSIS — M17.11 PRIMARY OSTEOARTHRITIS OF RIGHT KNEE: ICD-10-CM

## 2024-01-10 DIAGNOSIS — E55.9 VITAMIN D DEFICIENCY: ICD-10-CM

## 2024-01-10 DIAGNOSIS — Z28.21 INFLUENZA VACCINATION DECLINED BY PATIENT: ICD-10-CM

## 2024-01-10 DIAGNOSIS — I10 ESSENTIAL HYPERTENSION: Primary | ICD-10-CM

## 2024-01-10 DIAGNOSIS — Z12.4 SCREENING FOR CERVICAL CANCER: ICD-10-CM

## 2024-01-10 DIAGNOSIS — Z91.89 FRAMINGHAM CARDIAC RISK <10% IN NEXT 10 YEARS: ICD-10-CM

## 2024-01-10 DIAGNOSIS — M15.9 PRIMARY OSTEOARTHRITIS INVOLVING MULTIPLE JOINTS: ICD-10-CM

## 2024-01-10 PROCEDURE — 99213 OFFICE O/P EST LOW 20 MIN: CPT | Performed by: INTERNAL MEDICINE

## 2024-01-10 PROCEDURE — 1036F TOBACCO NON-USER: CPT | Performed by: INTERNAL MEDICINE

## 2024-01-10 PROCEDURE — 3079F DIAST BP 80-89 MM HG: CPT | Performed by: INTERNAL MEDICINE

## 2024-01-10 PROCEDURE — 3075F SYST BP GE 130 - 139MM HG: CPT | Performed by: INTERNAL MEDICINE

## 2024-01-10 NOTE — PATIENT INSTRUCTIONS
Thank you very much for coming.  I am very happy to see you.    You are doing very well with the AMLODIPINE, but it does have the side effect of leg swelling and constipation.  If any of these become an issue, please let me know.    Please use your water pill/FUROSEMIDE/LASIX sparingly.  Please drink lots of fluids throughout the day, and avoid salt.  Good for blood pressure control, and good for keeping your kidneys healthy.    Please check your blood pressure whenever you feel off.  Make sure that you are in a calm and resting state.  Call me if the first number is persistently 145 or higher.  Call me if the second number is persistently 85 or higher.    Please come back in 3 months.  Repeat some FASTING laboratory examinations via the [facility] or any  facility convenient to you, then see me soon after.  Until then, please continue to take care of yourself and your family, and please continue to pray for our recovery from this pandemic.  Please continue to stay safe.    I hope you had a good [place], and I do wish you a happy new year!  Please do not forget to get in touch with Dr. Lerma soon.            0  Return in 3 months.  20 minutes please.  FASTING laboratory examination, then see me soon after.  Reassess hemodynamics, cardiovascular risk.  Reassess fluid balance, and consider decreasing amlodipine and perhaps adding ACE inhibitor/ARB, depending on presentation.  Review preventive strategies, cardiovascular risk.            0

## 2024-01-10 NOTE — PROGRESS NOTES
"Crisubjective   Patient ID: Gwen Nice is a 58 y.o. female who presents for Follow-up.    HPI   Compliant with medications, tolerating regimens.  Occasional use of FUROSEMIDE, with no dizziness, diaphoresis, palpitations.  No lightheadedness noted.  No pam substernal chest pain, no orthopnea, no paroxysmal nocturnal dyspnea.  No headache, blurred vision, diplopia.  No dysphagia.  No change in urine character or habits, with no dysuria, flank, suprapubic pain.    Episodes of chronic cough, but no particular sputum production.  CONSTITUTIONALLY, no fever, no chills.  No night sweats.  No lingering anorexia or nausea.  No apparent lymphadenopathy.  No apparent weight loss.    Appetite preserved, no abdominal distress.  No dysuria, flank, suprapubic pain.  No skin changes, rashes, pruritus, jaundice.  Plans to see GYNECOLOGY soon.  ENDOCRINE with no polyuria, polydipsia, polyphagia.  No blurred vision.  No skin, hair, nail changes.  No dramatic weight loss or weight gain.          Review of Systems  Review of systems as in history of present illness, and otherwise, reviewed separately as well, and was unremarkable/negative/noncontributory.        Objective   /80 (BP Location: Left arm, Patient Position: Sitting, BP Cuff Size: Adult)   Pulse 84   Resp 20   Ht 1.575 m (5' 2\")   Wt 68.8 kg (151 lb 9.6 oz)   SpO2 97%   BMI 27.73 kg/m²     Physical Exam  In good spirits.  Not in distress or diaphoresis.  Alert, oriented x 3.  Amiable.  Not unkempt.  Moderately built.  Receptive, cheerful, appropriate.  Eager to continue to improve quality of life.  Does not wish harm to self or others.  Appropriate.    HEAD pink palpebral conjunctivae, anicteric sclerae.  NECK supple, no apparent jugular venous distention.  CARDIOVASCULAR not in distress or diaphoresis.  No bipedal edema.  LUNGS not in distress or diaphoresis.  Not using accessory muscles.  ABDOMEN soft, nontender.  BACK no costovertebral angle " tenderness.  EXTREMITIES no clubbing, no cyanosis.  NEURO no facial asymmetry.  No apparent cranial nerve deficits.  Romberg negative.  Ambulating without need of assistance.  No apparent focal weakness.  No tremors.  PSYCH receptive, appropriate, and eager to maintain and improve quality of life.      LABORATORY results reviewed with patient.      Assessment/Plan   Diagnoses and all orders for this visit:  Essential hypertension  -     Follow Up In Primary Care - Established  -     Comprehensive Metabolic Panel; Future  Hypercholesterolemia with hypertriglyceridemia  -     Comprehensive Metabolic Panel; Future  -     Lipid Panel; Future  Hyperglycemia  -     Comprehensive Metabolic Panel; Future  [place] cardiac risk <10% in next 10 years  Comments:  2.8% 01/24  Orders:  -     Comprehensive Metabolic Panel; Future  -     Lipid Panel; Future  Vitamin D deficiency  Chronic cough  Primary osteoarthritis involving multiple joints  Primary osteoarthritis of right knee  Screening for cervical cancer  COVID-19 vaccination declined  Influenza vaccination declined by patient  Other orders  -     Follow Up In Primary Care - Established; Future       Thank you very much for coming.  I am very happy to see you.    You are doing very well with the AMLODIPINE, but it does have the side effect of leg swelling and constipation.  If any of these become an issue, please let me know.    Please use your water pill/FUROSEMIDE/LASIX sparingly.  Please drink lots of fluids throughout the day, and avoid salt.  Good for blood pressure control, and good for keeping your kidneys healthy.    Please check your blood pressure whenever you feel off.  Make sure that you are in a calm and resting state.  Call me if the first number is persistently 145 or higher.  Call me if the second number is persistently 85 or higher.    Please come back in 3 months.  Repeat some FASTING laboratory examinations via the [facility] or any  facility  convenient to you, then see me soon after.  Until then, please continue to take care of yourself and your family, and please continue to pray for our recovery from this pandemic.  Please continue to stay safe.    I hope you had a good [place], and I do wish you a happy new year!  Please do not forget to get in touch with Dr. Lerma soon.            0  Return in 3 months.  20 minutes please.  FASTING laboratory examination, then see me soon after.  Reassess hemodynamics, cardiovascular risk.  Reassess fluid balance, and consider decreasing amlodipine and perhaps adding ACE inhibitor/ARB, depending on presentation.  Review preventive strategies, cardiovascular risk.            0

## 2024-01-19 ENCOUNTER — OFFICE VISIT (OUTPATIENT)
Dept: ORTHOPEDIC SURGERY | Facility: CLINIC | Age: 59
End: 2024-01-19
Payer: COMMERCIAL

## 2024-01-19 DIAGNOSIS — E78.5 HYPERLIPIDEMIA, UNSPECIFIED HYPERLIPIDEMIA TYPE: ICD-10-CM

## 2024-01-19 DIAGNOSIS — S83.231D COMPLEX TEAR OF MEDIAL MENISCUS OF RIGHT KNEE AS CURRENT INJURY, SUBSEQUENT ENCOUNTER: Primary | ICD-10-CM

## 2024-01-19 DIAGNOSIS — I10 HYPERTENSION, UNSPECIFIED TYPE: ICD-10-CM

## 2024-01-19 PROBLEM — S83.241A OTHER TEAR OF MEDIAL MENISCUS, CURRENT INJURY, RIGHT KNEE, INITIAL ENCOUNTER: Status: ACTIVE | Noted: 2024-01-19

## 2024-01-19 PROCEDURE — 99214 OFFICE O/P EST MOD 30 MIN: CPT | Performed by: ORTHOPAEDIC SURGERY

## 2024-01-19 PROCEDURE — 1036F TOBACCO NON-USER: CPT | Performed by: ORTHOPAEDIC SURGERY

## 2024-01-19 RX ORDER — ATORVASTATIN CALCIUM 10 MG/1
10 TABLET, FILM COATED ORAL DAILY
Qty: 90 TABLET | Refills: 0 | Status: SHIPPED | OUTPATIENT
Start: 2024-01-19 | End: 2024-04-12 | Stop reason: SDUPTHER

## 2024-01-19 RX ORDER — AMLODIPINE BESYLATE 10 MG/1
10 TABLET ORAL DAILY
Qty: 90 TABLET | Refills: 0 | Status: SHIPPED | OUTPATIENT
Start: 2024-01-19 | End: 2024-04-12 | Stop reason: SDUPTHER

## 2024-01-19 NOTE — PROGRESS NOTES
History of Present Illness   Chief Complaint   Patient presents with    Right Knee - Follow-up     Mri review       History of Present Illness   Patient presents after sustaining an injury to their knee.  They are complaining of ache, pain and discomfort.  We did a cortisone injection on 11/14/2022.  Overall injection did work pretty well about 90%.  She had relief which lasted for several months.  But she states it really has not recurred.  Still moderately uncomfortable.  She is doing a conservative treatment including ibuprofen and ice.    Imaging  Radiographs Knee: No acute fractures or dislocations.  Mild medial compartment arthritis grade 1.  Some loose bodies in the posterior lateral gutter  MRI.:  A complex tear posterior horn medial meniscus.  He has horizontal cleavage as well as a radial component near the root attachment.  Patient has some mild patellofemoral arthritis  Assessment:   Right knee medial meniscus tear    Plan:  We reviewed the role of imaging, physical therapy, injections and the time frame to healing and correlation with outcome.  Discussed the patient unfortunately is a fairly complex tear near the root attachment.  We discussed the meniscectomy versus a repair.  At this point plan is to proceed with meniscectomy.  Menisectomy plan of care:  Risks benefits and alternatives to surgery were discussed including but not limited to Infection, bleeding, neurovascular injury, pain and dysfunction, hardware related complications including cutout failure breakage, loss of function, motion, and permanent disability as well as the cardiovascular and pulmonary complications from anesthesia including death and DVT. Patient and family accept these risks.  We discussed specifically post meniscectomy pain, incomplete pain relief, meniscus retear, progressive arthritis, intraoperative decisions in regards to other pathology, and the potential for future revision surgeries including arthroplasty    Plan  for outpatient surgery   1. 1 week postop follow up   2. Percocet for postop pain relief. OARRS has been reviewed and is consistent with prescribed medications. This report is scanned into the electronic medical record. The risks of abuse, dependence, addiction and diversion were considered. The medication is felt to be clinically appropriate based on documented diagnosis .  3.  Pre-CERT for removal postoperative knee brace  Physical Exam  Well-nourished, well-developed. No acute distress. Alert and oriented x3. Responds appropriately to questioning. Good mood. Normal affect.  Physical Exam  Right knee:  Skin healthy and intact  No gross swelling or ecchymosis  Trace to 1+ Effusion:   ROM: 110.  Crepitance with range of motion  Pain along the medial joint line.  Positive Radha´s test/PositiveApley Grind  Neurovascular exam normal distally  Palpable pedal pulse and good cap refill     Review of Systems   GENERAL: Negative for malaise, significant weight loss, fever  MUSCULOSKELETAL: See HPI  NEURO:  Negative     No past medical history on file.    Medication Documentation Review Audit       Reviewed by Felton Urbano MD (Physician) on 01/10/24 at 1556      Medication Order Taking? Sig Documenting Provider Last Dose Status   amLODIPine (Norvasc) 10 mg tablet [id number]  Take 1 tablet (10 mg) by mouth once daily. Felton Urbano MD  Active   atorvastatin (Lipitor) 10 mg tablet [id number]  Take 1 tablet (10 mg) by mouth once daily. Felton Urbano MD  Active   citalopram (CeleXA) 10 mg tablet [id number]  Take 1 tablet (10 mg) by mouth once daily. Felton Urbano MD  Active   furosemide (Lasix) 20 mg tablet [id number]  Take 1 tablet (20 mg) by mouth every 8 hours if needed. Historical Provider, MD  Active    mg tablet [id number]  Take 1 tablet (800 mg) by mouth 3 times a day with meals. Historical Provider, MD  Active    mg tablet [id number]  TAKE ONE TABLET BY MOUTH THREE TIMES A DAY  WITH FOOD as needed Elkin Cotter PA-C  Active   omeprazole (PriLOSEC) 40 mg DR capsule [id number]  Take 1 capsule (40 mg) by mouth once daily. Felton Urbano MD  Active   oxybutynin XL (Ditropan-XL) 10 mg 24 hr tablet [id number]  TAKE ONE TABLET BY MOUTH EVERY DAY Radames Lerma MD  Active                    Allergies   Allergen Reactions    Aripiprazole Anaphylaxis    Bupropion Anaphylaxis and Swelling    Vancomycin Nausea And Vomiting     As of 5/25/20    Piperacillin-Tazobactam Swelling    Sulfa (Sulfonamide Antibiotics) Hives       Social History     Socioeconomic History    Marital status:      Spouse name: Not on file    Number of children: Not on file    Years of education: Not on file    Highest education level: Not on file   Occupational History    Not on file   Tobacco Use    Smoking status: Never    Smokeless tobacco: Never   Substance and Sexual Activity    Alcohol use: Not on file    Drug use: Not on file    Sexual activity: Not on file   Other Topics Concern    Not on file   Social History Narrative    Not on file     Social Determinants of Health     Financial Resource Strain: Not on file   Food Insecurity: Not on file   Transportation Needs: Not on file   Physical Activity: Not on file   Stress: Not on file   Social Connections: Not on file   Intimate Partner Violence: Not on file   Housing Stability: Not on file       Past Surgical History:   Procedure Laterality Date    OTHER SURGICAL HISTORY  12/04/2020    Breast augmentation    OTHER SURGICAL HISTORY  12/04/2020    Rhinoplasty    OTHER SURGICAL HISTORY  12/04/2020    Ulnar collateral ligament of thumb rupture repair       ECG 12 lead (Clinic Performed)    Result Date: 12/5/2023  Regular, sinus, 62, with no ST-T wave abnormalities, no signs of acute strain.  RSR prime pattern noted V2.  Small Q waves noted 1, aVL.  No old EKG for comparison.  Of note, patient with elevated blood pressure, but hemodynamically asymptomatic.

## 2024-01-19 NOTE — H&P (VIEW-ONLY)
History of Present Illness   Chief Complaint   Patient presents with    Right Knee - Follow-up     Mri review       History of Present Illness   Patient presents after sustaining an injury to their knee.  They are complaining of ache, pain and discomfort.  We did a cortisone injection on 11/14/2022.  Overall injection did work pretty well about 90%.  She had relief which lasted for several months.  But she states it really has not recurred.  Still moderately uncomfortable.  She is doing a conservative treatment including ibuprofen and ice.    Imaging  Radiographs Knee: No acute fractures or dislocations.  Mild medial compartment arthritis grade 1.  Some loose bodies in the posterior lateral gutter  MRI.:  A complex tear posterior horn medial meniscus.  He has horizontal cleavage as well as a radial component near the root attachment.  Patient has some mild patellofemoral arthritis  Assessment:   Right knee medial meniscus tear    Plan:  We reviewed the role of imaging, physical therapy, injections and the time frame to healing and correlation with outcome.  Discussed the patient unfortunately is a fairly complex tear near the root attachment.  We discussed the meniscectomy versus a repair.  At this point plan is to proceed with meniscectomy.  Menisectomy plan of care:  Risks benefits and alternatives to surgery were discussed including but not limited to Infection, bleeding, neurovascular injury, pain and dysfunction, hardware related complications including cutout failure breakage, loss of function, motion, and permanent disability as well as the cardiovascular and pulmonary complications from anesthesia including death and DVT. Patient and family accept these risks.  We discussed specifically post meniscectomy pain, incomplete pain relief, meniscus retear, progressive arthritis, intraoperative decisions in regards to other pathology, and the potential for future revision surgeries including arthroplasty    Plan  for outpatient surgery   1. 1 week postop follow up   2. Percocet for postop pain relief. OARRS has been reviewed and is consistent with prescribed medications. This report is scanned into the electronic medical record. The risks of abuse, dependence, addiction and diversion were considered. The medication is felt to be clinically appropriate based on documented diagnosis .  3.  Pre-CERT for removal postoperative knee brace  Physical Exam  Well-nourished, well-developed. No acute distress. Alert and oriented x3. Responds appropriately to questioning. Good mood. Normal affect.  Physical Exam  Right knee:  Skin healthy and intact  No gross swelling or ecchymosis  Trace to 1+ Effusion:   ROM: 110.  Crepitance with range of motion  Pain along the medial joint line.  Positive Radha´s test/PositiveApley Grind  Neurovascular exam normal distally  Palpable pedal pulse and good cap refill     Review of Systems   GENERAL: Negative for malaise, significant weight loss, fever  MUSCULOSKELETAL: See HPI  NEURO:  Negative     No past medical history on file.    Medication Documentation Review Audit       Reviewed by Felton Urbano MD (Physician) on 01/10/24 at 1556      Medication Order Taking? Sig Documenting Provider Last Dose Status   amLODIPine (Norvasc) 10 mg tablet [id number]  Take 1 tablet (10 mg) by mouth once daily. Felton Urbano MD  Active   atorvastatin (Lipitor) 10 mg tablet [id number]  Take 1 tablet (10 mg) by mouth once daily. Felton Urbano MD  Active   citalopram (CeleXA) 10 mg tablet [id number]  Take 1 tablet (10 mg) by mouth once daily. Felton Urbano MD  Active   furosemide (Lasix) 20 mg tablet [id number]  Take 1 tablet (20 mg) by mouth every 8 hours if needed. Historical Provider, MD  Active    mg tablet [id number]  Take 1 tablet (800 mg) by mouth 3 times a day with meals. Historical Provider, MD  Active    mg tablet [id number]  TAKE ONE TABLET BY MOUTH THREE TIMES A DAY  WITH FOOD as needed Elkin Cotter PA-C  Active   omeprazole (PriLOSEC) 40 mg DR capsule [id number]  Take 1 capsule (40 mg) by mouth once daily. Felton Urbano MD  Active   oxybutynin XL (Ditropan-XL) 10 mg 24 hr tablet [id number]  TAKE ONE TABLET BY MOUTH EVERY DAY Radames Lerma MD  Active                    Allergies   Allergen Reactions    Aripiprazole Anaphylaxis    Bupropion Anaphylaxis and Swelling    Vancomycin Nausea And Vomiting     As of 5/25/20    Piperacillin-Tazobactam Swelling    Sulfa (Sulfonamide Antibiotics) Hives       Social History     Socioeconomic History    Marital status:      Spouse name: Not on file    Number of children: Not on file    Years of education: Not on file    Highest education level: Not on file   Occupational History    Not on file   Tobacco Use    Smoking status: Never    Smokeless tobacco: Never   Substance and Sexual Activity    Alcohol use: Not on file    Drug use: Not on file    Sexual activity: Not on file   Other Topics Concern    Not on file   Social History Narrative    Not on file     Social Determinants of Health     Financial Resource Strain: Not on file   Food Insecurity: Not on file   Transportation Needs: Not on file   Physical Activity: Not on file   Stress: Not on file   Social Connections: Not on file   Intimate Partner Violence: Not on file   Housing Stability: Not on file       Past Surgical History:   Procedure Laterality Date    OTHER SURGICAL HISTORY  12/04/2020    Breast augmentation    OTHER SURGICAL HISTORY  12/04/2020    Rhinoplasty    OTHER SURGICAL HISTORY  12/04/2020    Ulnar collateral ligament of thumb rupture repair       ECG 12 lead (Clinic Performed)    Result Date: 12/5/2023  Regular, sinus, 62, with no ST-T wave abnormalities, no signs of acute strain.  RSR prime pattern noted V2.  Small Q waves noted 1, aVL.  No old EKG for comparison.  Of note, patient with elevated blood pressure, but hemodynamically asymptomatic.

## 2024-01-25 ENCOUNTER — TELEPHONE (OUTPATIENT)
Dept: ORTHOPEDIC SURGERY | Facility: CLINIC | Age: 59
End: 2024-01-25
Payer: COMMERCIAL

## 2024-01-25 NOTE — TELEPHONE ENCOUNTER
01/25/24  Tried to contact pt re availability of crutches needed for post-op use.  However, mailbox was full, therefore I LVM with her contact (sibling) to call DME office when she is able.

## 2024-01-26 ENCOUNTER — LAB (OUTPATIENT)
Dept: LAB | Facility: HOSPITAL | Age: 59
End: 2024-01-26
Payer: COMMERCIAL

## 2024-01-26 ENCOUNTER — HOSPITAL ENCOUNTER (OUTPATIENT)
Dept: CARDIOLOGY | Facility: HOSPITAL | Age: 59
Discharge: HOME | End: 2024-01-26
Payer: COMMERCIAL

## 2024-01-26 DIAGNOSIS — S83.241A OTHER TEAR OF MEDIAL MENISCUS, CURRENT INJURY, RIGHT KNEE, INITIAL ENCOUNTER: ICD-10-CM

## 2024-01-26 DIAGNOSIS — Z91.89 FRAMINGHAM CARDIAC RISK <10% IN NEXT 10 YEARS: ICD-10-CM

## 2024-01-26 DIAGNOSIS — I10 ESSENTIAL HYPERTENSION: ICD-10-CM

## 2024-01-26 DIAGNOSIS — E78.2 HYPERCHOLESTEROLEMIA WITH HYPERTRIGLYCERIDEMIA: ICD-10-CM

## 2024-01-26 DIAGNOSIS — R73.9 HYPERGLYCEMIA: ICD-10-CM

## 2024-01-26 LAB
ALBUMIN SERPL BCP-MCNC: 4.5 G/DL (ref 3.4–5)
ALP SERPL-CCNC: 63 U/L (ref 33–110)
ALT SERPL W P-5'-P-CCNC: 21 U/L (ref 7–45)
ANION GAP SERPL CALC-SCNC: 16 MMOL/L (ref 10–20)
APTT PPP: 28 SECONDS (ref 27–38)
AST SERPL W P-5'-P-CCNC: 18 U/L (ref 9–39)
BASOPHILS # BLD AUTO: 0.05 X10*3/UL (ref 0–0.1)
BASOPHILS NFR BLD AUTO: 0.8 %
BILIRUB SERPL-MCNC: 0.7 MG/DL (ref 0–1.2)
BUN SERPL-MCNC: 17 MG/DL (ref 6–23)
CALCIUM SERPL-MCNC: 9.2 MG/DL (ref 8.6–10.3)
CHLORIDE SERPL-SCNC: 102 MMOL/L (ref 98–107)
CHOLEST SERPL-MCNC: 212 MG/DL (ref 0–199)
CHOLESTEROL/HDL RATIO: 2
CO2 SERPL-SCNC: 27 MMOL/L (ref 21–32)
CREAT SERPL-MCNC: 0.75 MG/DL (ref 0.5–1.05)
EGFRCR SERPLBLD CKD-EPI 2021: >90 ML/MIN/1.73M*2
EOSINOPHIL # BLD AUTO: 0.09 X10*3/UL (ref 0–0.7)
EOSINOPHIL NFR BLD AUTO: 1.5 %
ERYTHROCYTE [DISTWIDTH] IN BLOOD BY AUTOMATED COUNT: 12.6 % (ref 11.5–14.5)
GLUCOSE SERPL-MCNC: 110 MG/DL (ref 74–99)
HCT VFR BLD AUTO: 36.5 % (ref 36–46)
HDLC SERPL-MCNC: 103.9 MG/DL
HGB BLD-MCNC: 12.5 G/DL (ref 12–16)
IMM GRANULOCYTES # BLD AUTO: 0.01 X10*3/UL (ref 0–0.7)
IMM GRANULOCYTES NFR BLD AUTO: 0.2 % (ref 0–0.9)
INR PPP: 1 (ref 0.9–1.1)
LDLC SERPL CALC-MCNC: 95 MG/DL
LYMPHOCYTES # BLD AUTO: 1.77 X10*3/UL (ref 1.2–4.8)
LYMPHOCYTES NFR BLD AUTO: 29.7 %
MCH RBC QN AUTO: 30.7 PG (ref 26–34)
MCHC RBC AUTO-ENTMCNC: 34.2 G/DL (ref 32–36)
MCV RBC AUTO: 90 FL (ref 80–100)
MONOCYTES # BLD AUTO: 0.57 X10*3/UL (ref 0.1–1)
MONOCYTES NFR BLD AUTO: 9.6 %
NEUTROPHILS # BLD AUTO: 3.46 X10*3/UL (ref 1.2–7.7)
NEUTROPHILS NFR BLD AUTO: 58.2 %
NON HDL CHOLESTEROL: 108 MG/DL (ref 0–149)
NRBC BLD-RTO: 0 /100 WBCS (ref 0–0)
PLATELET # BLD AUTO: 279 X10*3/UL (ref 150–450)
POTASSIUM SERPL-SCNC: 3.8 MMOL/L (ref 3.5–5.3)
PROT SERPL-MCNC: 7.3 G/DL (ref 6.4–8.2)
PROTHROMBIN TIME: 11.2 SECONDS (ref 9.8–12.8)
RBC # BLD AUTO: 4.07 X10*6/UL (ref 4–5.2)
SODIUM SERPL-SCNC: 141 MMOL/L (ref 136–145)
TRIGL SERPL-MCNC: 64 MG/DL (ref 0–149)
VLDL: 13 MG/DL (ref 0–40)
WBC # BLD AUTO: 6 X10*3/UL (ref 4.4–11.3)

## 2024-01-26 PROCEDURE — 36415 COLL VENOUS BLD VENIPUNCTURE: CPT

## 2024-01-26 PROCEDURE — 85025 COMPLETE CBC W/AUTO DIFF WBC: CPT

## 2024-01-26 PROCEDURE — 93010 ELECTROCARDIOGRAM REPORT: CPT | Performed by: INTERNAL MEDICINE

## 2024-01-26 PROCEDURE — 80053 COMPREHEN METABOLIC PANEL: CPT

## 2024-01-26 PROCEDURE — 93005 ELECTROCARDIOGRAM TRACING: CPT

## 2024-01-26 PROCEDURE — 85610 PROTHROMBIN TIME: CPT

## 2024-01-26 PROCEDURE — 80061 LIPID PANEL: CPT

## 2024-01-27 LAB
ATRIAL RATE: 68 BPM
P AXIS: 60 DEGREES
P OFFSET: 202 MS
P ONSET: 160 MS
PR INTERVAL: 130 MS
Q ONSET: 225 MS
QRS COUNT: 11 BEATS
QRS DURATION: 86 MS
QT INTERVAL: 364 MS
QTC CALCULATION(BAZETT): 387 MS
QTC FREDERICIA: 379 MS
R AXIS: 16 DEGREES
T AXIS: 50 DEGREES
T OFFSET: 407 MS
VENTRICULAR RATE: 68 BPM

## 2024-02-12 DIAGNOSIS — M25.561 RIGHT KNEE PAIN, UNSPECIFIED CHRONICITY: ICD-10-CM

## 2024-02-12 DIAGNOSIS — S83.231D COMPLEX TEAR OF MEDIAL MENISCUS OF RIGHT KNEE AS CURRENT INJURY, SUBSEQUENT ENCOUNTER: ICD-10-CM

## 2024-02-12 NOTE — PREPROCEDURE INSTRUCTIONS
Patient and nurse discussed pre-operative instructions of the location of procedure, NPO status, home medications, and what to aspect after procedure.  Patient is aware to not smoke nicotine products, drink alcohol, or do any drugs within 24hrs of procedure.  Not to bring any valuables and to not wear any metal, jewelry, piercing's or beauty products for surgery.   Informed about the call the business day prior to procedure that will be give the exact time of arrival on the day of surgery, between 2PM-4PM.  Any questions call the surgeons office, and any questions about Pre-Admission Testing call [phone]

## 2024-02-13 ENCOUNTER — ANESTHESIA EVENT (OUTPATIENT)
Dept: OPERATING ROOM | Facility: HOSPITAL | Age: 59
End: 2024-02-13
Payer: COMMERCIAL

## 2024-02-13 RX ORDER — SODIUM CHLORIDE, SODIUM LACTATE, POTASSIUM CHLORIDE, CALCIUM CHLORIDE 600; 310; 30; 20 MG/100ML; MG/100ML; MG/100ML; MG/100ML
100 INJECTION, SOLUTION INTRAVENOUS CONTINUOUS
Status: CANCELLED | OUTPATIENT
Start: 2024-02-13

## 2024-02-13 SDOH — HEALTH STABILITY: MENTAL HEALTH: CURRENT SMOKER: 0

## 2024-02-13 NOTE — ANESTHESIA PREPROCEDURE EVALUATION
Patient: Gwen Nice    Procedure Information       Date/Time: 02/14/24 3030    Procedure: ARTHROSCOPY KNEE MEDIAL MENISCECTOMY (Right: Knee)    Location: [facility] / [facility]    Surgeons: Elkin Welsh MD            Relevant Problems   Cardiovascular   (+) Essential hypertension   (+) Hypercholesterolemia with hypertriglyceridemia      GI   (+) Gastroesophageal reflux disease   (+) Gastroesophageal reflux disease with esophagitis without hemorrhage      Neuro/Psych   (+) Depression with anxiety      Musculoskeletal   (+) Primary osteoarthritis involving multiple joints   (+) Primary osteoarthritis of right knee      Infectious Disease   (+) Felon of finger   (+) Subacute osteomyelitis of right hand (CMS/HCC)      Other   (+) Subacute osteomyelitis of right hand (CMS/HCC)       Clinical information reviewed:   Tobacco  Allergies  Meds   Med Hx  Surg Hx   Fam Hx  Soc Hx        NPO Detail:  No data recorded     PHYSICAL EXAM    Anesthesia Plan    History of general anesthesia?: yes  History of complications of general anesthesia?: yes    ASA 3     general   (Scopolamine preop)  The patient is not a current smoker.    intravenous induction   Anesthetic plan and risks discussed with patient.

## 2024-02-14 ENCOUNTER — ANESTHESIA (OUTPATIENT)
Dept: OPERATING ROOM | Facility: HOSPITAL | Age: 59
End: 2024-02-14
Payer: COMMERCIAL

## 2024-02-14 ENCOUNTER — HOSPITAL ENCOUNTER (OUTPATIENT)
Facility: HOSPITAL | Age: 59
Setting detail: OUTPATIENT SURGERY
Discharge: HOME | End: 2024-02-14
Attending: ORTHOPAEDIC SURGERY | Admitting: ORTHOPAEDIC SURGERY
Payer: COMMERCIAL

## 2024-02-14 VITALS
WEIGHT: 145.5 LBS | TEMPERATURE: 97.5 F | HEIGHT: 61 IN | BODY MASS INDEX: 27.47 KG/M2 | HEART RATE: 88 BPM | RESPIRATION RATE: 16 BRPM | SYSTOLIC BLOOD PRESSURE: 139 MMHG | DIASTOLIC BLOOD PRESSURE: 77 MMHG | OXYGEN SATURATION: 98 %

## 2024-02-14 DIAGNOSIS — S83.241A OTHER TEAR OF MEDIAL MENISCUS, CURRENT INJURY, RIGHT KNEE, INITIAL ENCOUNTER: Primary | ICD-10-CM

## 2024-02-14 PROCEDURE — 3600000009 HC OR TIME - EACH INCREMENTAL 1 MINUTE - PROCEDURE LEVEL FOUR: Performed by: ORTHOPAEDIC SURGERY

## 2024-02-14 PROCEDURE — 3700000002 HC GENERAL ANESTHESIA TIME - EACH INCREMENTAL 1 MINUTE: Performed by: ORTHOPAEDIC SURGERY

## 2024-02-14 PROCEDURE — 7100000001 HC RECOVERY ROOM TIME - INITIAL BASE CHARGE: Performed by: ORTHOPAEDIC SURGERY

## 2024-02-14 PROCEDURE — 2500000004 HC RX 250 GENERAL PHARMACY W/ HCPCS (ALT 636 FOR OP/ED): Performed by: ANESTHESIOLOGY

## 2024-02-14 PROCEDURE — 29880 ARTHRS KNE SRG MNISECTMY M&L: CPT | Performed by: ORTHOPAEDIC SURGERY

## 2024-02-14 PROCEDURE — 3700000001 HC GENERAL ANESTHESIA TIME - INITIAL BASE CHARGE: Performed by: ORTHOPAEDIC SURGERY

## 2024-02-14 PROCEDURE — 2500000004 HC RX 250 GENERAL PHARMACY W/ HCPCS (ALT 636 FOR OP/ED): Performed by: ORTHOPAEDIC SURGERY

## 2024-02-14 PROCEDURE — 7100000010 HC PHASE TWO TIME - EACH INCREMENTAL 1 MINUTE: Performed by: ORTHOPAEDIC SURGERY

## 2024-02-14 PROCEDURE — 2500000005 HC RX 250 GENERAL PHARMACY W/O HCPCS: Performed by: ANESTHESIOLOGY

## 2024-02-14 PROCEDURE — 7100000002 HC RECOVERY ROOM TIME - EACH INCREMENTAL 1 MINUTE: Performed by: ORTHOPAEDIC SURGERY

## 2024-02-14 PROCEDURE — 2720000007 HC OR 272 NO HCPCS: Performed by: ORTHOPAEDIC SURGERY

## 2024-02-14 PROCEDURE — 2500000001 HC RX 250 WO HCPCS SELF ADMINISTERED DRUGS (ALT 637 FOR MEDICARE OP): Performed by: ANESTHESIOLOGY

## 2024-02-14 PROCEDURE — 2500000005 HC RX 250 GENERAL PHARMACY W/O HCPCS: Performed by: ORTHOPAEDIC SURGERY

## 2024-02-14 PROCEDURE — 7100000009 HC PHASE TWO TIME - INITIAL BASE CHARGE: Performed by: ORTHOPAEDIC SURGERY

## 2024-02-14 PROCEDURE — A4217 STERILE WATER/SALINE, 500 ML: HCPCS | Performed by: ORTHOPAEDIC SURGERY

## 2024-02-14 PROCEDURE — 3600000004 HC OR TIME - INITIAL BASE CHARGE - PROCEDURE LEVEL FOUR: Performed by: ORTHOPAEDIC SURGERY

## 2024-02-14 RX ORDER — CEFAZOLIN SODIUM 2 G/100ML
2 INJECTION, SOLUTION INTRAVENOUS ONCE
Status: COMPLETED | OUTPATIENT
Start: 2024-02-14 | End: 2024-02-14

## 2024-02-14 RX ORDER — SODIUM CHLORIDE 9 MG/ML
100 INJECTION, SOLUTION INTRAVENOUS CONTINUOUS
Status: DISCONTINUED | OUTPATIENT
Start: 2024-02-14 | End: 2024-02-14 | Stop reason: HOSPADM

## 2024-02-14 RX ORDER — MIDAZOLAM HYDROCHLORIDE 1 MG/ML
INJECTION, SOLUTION INTRAMUSCULAR; INTRAVENOUS AS NEEDED
Status: DISCONTINUED | OUTPATIENT
Start: 2024-02-14 | End: 2024-02-14

## 2024-02-14 RX ORDER — LIDOCAINE HYDROCHLORIDE 20 MG/ML
INJECTION, SOLUTION INFILTRATION; PERINEURAL AS NEEDED
Status: DISCONTINUED | OUTPATIENT
Start: 2024-02-14 | End: 2024-02-14

## 2024-02-14 RX ORDER — FENTANYL CITRATE 50 UG/ML
INJECTION, SOLUTION INTRAMUSCULAR; INTRAVENOUS AS NEEDED
Status: DISCONTINUED | OUTPATIENT
Start: 2024-02-14 | End: 2024-02-14

## 2024-02-14 RX ORDER — SODIUM CHLORIDE, SODIUM LACTATE, POTASSIUM CHLORIDE, CALCIUM CHLORIDE 600; 310; 30; 20 MG/100ML; MG/100ML; MG/100ML; MG/100ML
100 INJECTION, SOLUTION INTRAVENOUS CONTINUOUS
Status: DISCONTINUED | OUTPATIENT
Start: 2024-02-14 | End: 2024-02-14 | Stop reason: HOSPADM

## 2024-02-14 RX ORDER — PROPOFOL 10 MG/ML
INJECTION, EMULSION INTRAVENOUS AS NEEDED
Status: DISCONTINUED | OUTPATIENT
Start: 2024-02-14 | End: 2024-02-14

## 2024-02-14 RX ORDER — OXYCODONE HYDROCHLORIDE 5 MG/1
5 TABLET ORAL EVERY 4 HOURS PRN
Status: DISCONTINUED | OUTPATIENT
Start: 2024-02-14 | End: 2024-02-14 | Stop reason: HOSPADM

## 2024-02-14 RX ORDER — SODIUM CHLORIDE 0.9 G/100ML
IRRIGANT IRRIGATION AS NEEDED
Status: DISCONTINUED | OUTPATIENT
Start: 2024-02-14 | End: 2024-02-14 | Stop reason: HOSPADM

## 2024-02-14 RX ORDER — OXYCODONE AND ACETAMINOPHEN 5; 325 MG/1; MG/1
1 TABLET ORAL EVERY 6 HOURS PRN
Qty: 28 TABLET | Refills: 0 | Status: SHIPPED | OUTPATIENT
Start: 2024-02-14 | End: 2024-02-21

## 2024-02-14 RX ORDER — ONDANSETRON HYDROCHLORIDE 2 MG/ML
INJECTION, SOLUTION INTRAVENOUS AS NEEDED
Status: DISCONTINUED | OUTPATIENT
Start: 2024-02-14 | End: 2024-02-14

## 2024-02-14 RX ORDER — FENTANYL CITRATE 50 UG/ML
50 INJECTION, SOLUTION INTRAMUSCULAR; INTRAVENOUS EVERY 5 MIN PRN
Status: DISCONTINUED | OUTPATIENT
Start: 2024-02-14 | End: 2024-02-14 | Stop reason: HOSPADM

## 2024-02-14 RX ORDER — DEXAMETHASONE SODIUM PHOSPHATE 100 MG/10ML
INJECTION INTRAMUSCULAR; INTRAVENOUS AS NEEDED
Status: DISCONTINUED | OUTPATIENT
Start: 2024-02-14 | End: 2024-02-14

## 2024-02-14 RX ORDER — BUPIVACAINE HCL/EPINEPHRINE 0.25-.0005
VIAL (ML) INJECTION AS NEEDED
Status: DISCONTINUED | OUTPATIENT
Start: 2024-02-14 | End: 2024-02-14 | Stop reason: HOSPADM

## 2024-02-14 RX ORDER — MEPERIDINE HYDROCHLORIDE 25 MG/ML
12.5 INJECTION INTRAMUSCULAR; INTRAVENOUS; SUBCUTANEOUS EVERY 10 MIN PRN
Status: DISCONTINUED | OUTPATIENT
Start: 2024-02-14 | End: 2024-02-14 | Stop reason: HOSPADM

## 2024-02-14 RX ORDER — ONDANSETRON HYDROCHLORIDE 2 MG/ML
4 INJECTION, SOLUTION INTRAVENOUS ONCE AS NEEDED
Status: DISCONTINUED | OUTPATIENT
Start: 2024-02-14 | End: 2024-02-14 | Stop reason: HOSPADM

## 2024-02-14 RX ADMIN — OXYCODONE HYDROCHLORIDE 5 MG: 5 TABLET ORAL at 09:44

## 2024-02-14 RX ADMIN — LIDOCAINE HYDROCHLORIDE 50 MG: 20 INJECTION, SOLUTION INFILTRATION; PERINEURAL at 07:34

## 2024-02-14 RX ADMIN — FENTANYL CITRATE 50 MCG: 50 INJECTION, SOLUTION INTRAMUSCULAR; INTRAVENOUS at 07:40

## 2024-02-14 RX ADMIN — FENTANYL CITRATE 50 MCG: 50 INJECTION, SOLUTION INTRAMUSCULAR; INTRAVENOUS at 08:24

## 2024-02-14 RX ADMIN — CEFAZOLIN SODIUM 2 G: 2 INJECTION, SOLUTION INTRAVENOUS at 07:37

## 2024-02-14 RX ADMIN — HYDROMORPHONE HYDROCHLORIDE 0.5 MG: 1 INJECTION, SOLUTION INTRAMUSCULAR; INTRAVENOUS; SUBCUTANEOUS at 08:54

## 2024-02-14 RX ADMIN — DEXAMETHASONE SODIUM PHOSPHATE 4 MG: 10 INJECTION INTRAMUSCULAR; INTRAVENOUS at 07:37

## 2024-02-14 RX ADMIN — FENTANYL CITRATE 50 MCG: 50 INJECTION, SOLUTION INTRAMUSCULAR; INTRAVENOUS at 08:34

## 2024-02-14 RX ADMIN — ONDANSETRON 4 MG: 2 INJECTION, SOLUTION INTRAMUSCULAR; INTRAVENOUS at 08:02

## 2024-02-14 RX ADMIN — PROPOFOL 150 MG: 10 INJECTION, EMULSION INTRAVENOUS at 07:34

## 2024-02-14 RX ADMIN — FENTANYL CITRATE 50 MCG: 50 INJECTION, SOLUTION INTRAMUSCULAR; INTRAVENOUS at 07:45

## 2024-02-14 RX ADMIN — SODIUM CHLORIDE 100 ML/HR: 9 INJECTION, SOLUTION INTRAVENOUS at 05:55

## 2024-02-14 RX ADMIN — MIDAZOLAM 2 MG: 1 INJECTION INTRAMUSCULAR; INTRAVENOUS at 07:27

## 2024-02-14 RX ADMIN — HYDROMORPHONE HYDROCHLORIDE 0.5 MG: 1 INJECTION, SOLUTION INTRAMUSCULAR; INTRAVENOUS; SUBCUTANEOUS at 08:42

## 2024-02-14 ASSESSMENT — PAIN DESCRIPTION - DESCRIPTORS
DESCRIPTORS: ACHING
DESCRIPTORS: SORE
DESCRIPTORS: ACHING
DESCRIPTORS: SORE
DESCRIPTORS: ACHING

## 2024-02-14 ASSESSMENT — PAIN - FUNCTIONAL ASSESSMENT
PAIN_FUNCTIONAL_ASSESSMENT: 0-10

## 2024-02-14 ASSESSMENT — PAIN SCALES - GENERAL
PAINLEVEL_OUTOF10: 6
PAINLEVEL_OUTOF10: 1
PAINLEVEL_OUTOF10: 7
PAINLEVEL_OUTOF10: 4
PAINLEVEL_OUTOF10: 7
PAINLEVEL_OUTOF10: 3
PAIN_LEVEL: 0
PAINLEVEL_OUTOF10: 5 - MODERATE PAIN
PAINLEVEL_OUTOF10: 6
PAINLEVEL_OUTOF10: 4
PAINLEVEL_OUTOF10: 5 - MODERATE PAIN
PAINLEVEL_OUTOF10: 4
PAINLEVEL_OUTOF10: 8
PAINLEVEL_OUTOF10: 3
PAINLEVEL_OUTOF10: 6
PAINLEVEL_OUTOF10: 4

## 2024-02-14 ASSESSMENT — COLUMBIA-SUICIDE SEVERITY RATING SCALE - C-SSRS
6. HAVE YOU EVER DONE ANYTHING, STARTED TO DO ANYTHING, OR PREPARED TO DO ANYTHING TO END YOUR LIFE?: YES
1. IN THE PAST MONTH, HAVE YOU WISHED YOU WERE DEAD OR WISHED YOU COULD GO TO SLEEP AND NOT WAKE UP?: NO
2. HAVE YOU ACTUALLY HAD ANY THOUGHTS OF KILLING YOURSELF?: NO
6. HAVE YOU EVER DONE ANYTHING, STARTED TO DO ANYTHING, OR PREPARED TO DO ANYTHING TO END YOUR LIFE?: NO

## 2024-02-14 ASSESSMENT — PAIN DESCRIPTION - ORIENTATION
ORIENTATION: RIGHT

## 2024-02-14 ASSESSMENT — PAIN DESCRIPTION - LOCATION
LOCATION: KNEE

## 2024-02-14 NOTE — NURSING NOTE
"Pt ambulated with walker and little assistance to bathroom. During ambulation she stated\"Im having a hot flash, hands and feet sweaty, states has them at home a lot\"Pt returned to room, arms damp and cool, pt states slightly dizzy. Pt returned to lay down,cool cloth to forehead. Vital signs unchanged. /[id number]  pulse ox 98%..  after 5minutes pt skin dry and warm.  "

## 2024-02-14 NOTE — BRIEF OP NOTE
Date: 2024  OR Location: [facility]    Name: Gwen Nice, : 1965, Age: 58 y.o., MRN: [id number], Sex: female    Diagnosis  Pre-op Diagnosis     * Other tear of medial meniscus, current injury, right knee, initial encounter [M28.891A] Post-op Diagnosis     * Other tear of medial meniscus, current injury, right knee, initial encounter [M97.369G]     Procedures  ARTHROSCOPY KNEE MEDIAL MENISCECTOMY  [address] - [place] ARTHRS KNE SURG W/MENISCECTOMY MED/LAT W/SHVG      Surgeons      * Elkin Welsh - Primary    Resident/Fellow/Other Assistant:  Surgeon(s) and Role:    Procedure Summary  Anesthesia: General  ASA: III  Anesthesia Staff: Anesthesiologist: Ayaz Magdaleno MD  Estimated Blood Loss: 20mL  Intra-op Medications:   Administrations occurring from 0730 to 0830 on 24:   Medication Name Total Dose   sodium chloride 0.9 % irrigation solution 6,000 mL   BUPivacaine-EPINEPHrine (Marcaine w/EPI) 0.25 %-1:200,000 injection 30 mL   fentaNYL PF (Sublimaze) injection 50 mcg 50 mcg   sodium chloride 0.9% infusion Cannot be calculated   ceFAZolin in dextrose (iso-os) (Ancef) IVPB 2 g 2 g              Anesthesia Record               Intraprocedure I/O Totals          Intake    sodium chloride 0.9% infusion 500.00 mL    Total Intake 500 mL          Specimen: No specimens collected     Staff:   Circulator: Kajal Sullivan RN  Scrub Person: Deyanira Jhaveri          Findings: LMT MMT    Complications:  None; patient tolerated the procedure well.     Disposition: PACU - hemodynamically stable.  Condition: stable  Specimens Collected: No specimens collected  Attending Attestation: I was present and scrubbed for the entire procedure.    Elkin Welsh  Phone Number: [phone]

## 2024-02-14 NOTE — OP NOTE
ARTHROSCOPY KNEE MEDIAL MENISCECTOMY (R) Operative Note     Date: 2024  OR Location: [facility]    Name: Gwen Nice, : 1965, Age: 58 y.o., MRN: [id number], Sex: female    Diagnosis  Pre-op Diagnosis     * Other tear of medial meniscus, current injury, right knee, initial encounter [S83.784A] Post-op Diagnosis     * Other tear of medial meniscus, current injury, right knee, initial encounter [S83.050A]     Procedures  ARTHROSCOPY KNEE MEDIAL MENISCECTOMY  [address]85 - [place] ARTHRS KNE SURG W/MENISCECTOMY MED/LAT W/SHVG  Right knee arthroscopy partial medial and lateral meniscectomy 15%    Surgeons      * Elkin Welsh - Primary    Resident/Fellow/Other Assistant:  Surgeon(s) and Role: Geremias Alvarez    Procedure Summary  Anesthesia: General  ASA: III  Anesthesia Staff: Anesthesiologist: Ayaz Magdaleno MD  Estimated Blood Loss: Minimal mL  Intra-op Medications:   Administrations occurring from 0730 to 0830 on 24:   Medication Name Total Dose   sodium chloride 0.9 % irrigation solution 6,000 mL   BUPivacaine-EPINEPHrine (Marcaine w/EPI) 0.25 %-1:200,000 injection 30 mL   fentaNYL PF (Sublimaze) injection 50 mcg 50 mcg   sodium chloride 0.9% infusion Cannot be calculated   ceFAZolin in dextrose (iso-os) (Ancef) IVPB 2 g 2 g              Anesthesia Record               Intraprocedure I/O Totals          Intake    sodium chloride 0.9% infusion 500.00 mL    Total Intake 500 mL          Specimen: No specimens collected     Staff:   Circulator: Kajal Sullivan RN  Scrub Person: Dyeanira Jhaveri         Drains and/or Catheters: * None in log *    Tourniquet Times:         Implants:     Findings: Medial and lateral meniscus tear  Indications: 58-year-old female injured her right knee resulting in medial and lateral meniscus tear elected proceed with surgery for right knee arthroscopy with meniscectomy    Risks benefits and alternatives to surgery were discussed including but not limited to Infection, bleeding, neurovascular  injury, pain and dysfunction, hardware related complications including cutout failure breakage, loss of function, motion, and permanent disability as well as the cardiovascular and pulmonary complications from anesthesia including death and DVT. Patient and family accept these risks.  We discussed specifically post meniscectomy pain, incomplete pain relief, meniscus retear, progressive arthritis, intraoperative decisions in regards to other pathology, and the potential for future revision surgeries including arthroplasty    Patient identified in the preop area.  Right lower extremity marked and confirmed with patient as the operative site.  Brought back to the operating room and anesthetized under general anesthesia.  Operative lower extremity was then prepped and draped in standard sterile fashion.  Patient, site and procedure were confirmed with timeout.  Everyone in the room agreed.  Preop antibiotics were given.    We then made standard medial and lateral arthroscopy portal and the scope was introduced.  Medial joint space : Patient has a complex tear posterior horn medial meniscus at the root attachment.  The root was detached and freely mobile.  Resect about 50% total meniscal volume back to a stable base.  Shifting along the meniscocapsular junction for bleeding.  She did have a focal area of grade 4 cartilage change on the medial tibia 1 cm.  Some diffuse cartilage change.  Also had notable grade 4 medial compartment loss in full knee extension at the medial trochlea  ACL and notch were normal  Lateral joint space: Inner edge tear of the lateral meniscus at the mid body anteriorly.  Did a minimal resection of a maybe 10 to 15% total volume.  Fairly healthy cartilage on the lateral side with some deep fissuring patellofemoral joint space: Moderate patellofemoral cartilage changes some early arthritis.  Circumferentially released the plica.  History of focal area of grade 3 almost 4 loss in the medial  facet  Wounds were irrigated with normal saline.  Portals were closed with standard fashion.  Sterile dressings were applied.  Patient was then awoken from general anesthesia and sent to the PACU in stable condition.    Geremias Alvarez PA-C was present throughout the entire case. Given the nature of the disease process and the procedure, a skilled surgical first assistant was necessary during the case. The assistant was necessary to hold retractors and to manipulate the extremity during the procedure. A certified scrub tech was at the back table managing the instruments and supplies for the surgical case.  Elkin Welsh  Phone Number: [phone]

## 2024-02-14 NOTE — NURSING NOTE
Pt returned to [facility], pt states has a walker at home to use, does not want crutches. Pt has a scopalamine patch behind right ear, precautions given to patient and mother regarding this medication and handwashing when touchs patch.

## 2024-02-14 NOTE — ANESTHESIA PROCEDURE NOTES
Airway  Date/Time: 2/14/2024 7:35 AM  Urgency: elective    Airway not difficult    Staffing  Performed: attending   Authorized by: Ayaz Magdaleno MD    Performed by: Ayaz Magdaleno MD  Patient location during procedure: OR    Indications and Patient Condition  Indications for airway management: anesthesia  Spontaneous Ventilation: absent  Sedation level: deep  Preoxygenated: yes  Patient position: sniffing  MILS maintained throughout  Mask difficulty assessment: 0 - not attempted    Final Airway Details  Final airway type: supraglottic airway      Successful airway: classic  Size 3     Number of attempts at approach: 1  Ventilation between attempts: none

## 2024-02-14 NOTE — ANESTHESIA POSTPROCEDURE EVALUATION
Patient: Gwen Nice    Procedure Summary       Date: 02/14/24 Room / Location: [facility] / [facility]    Anesthesia Start: 0728 Anesthesia Stop:     Procedure: ARTHROSCOPY KNEE MEDIAL MENISCECTOMY (Right: Knee) Diagnosis:       Other tear of medial meniscus, current injury, right knee, initial encounter      (Other tear of medial meniscus, current injury, right knee, initial encounter [S83.125A])    Surgeons: Elkin Welsh MD Responsible Provider: Ayaz Magdaleno MD    Anesthesia Type: general ASA Status: 3            Anesthesia Type: general    Vitals Value Taken Time   /96 02/14/24 0819   Temp 36.0 02/14/24 0819   Pulse 100 02/14/24 0819   Resp 14 02/14/24 0819   SpO2 100% 02/14/24 0819       Anesthesia Post Evaluation    Patient location during evaluation: bedside  Patient participation: complete - patient participated  Level of consciousness: awake and alert  Pain score: 0  Pain management: adequate  Multimodal analgesia pain management approach  Airway patency: patent  Cardiovascular status: acceptable  Respiratory status: acceptable and nasal cannula  Hydration status: acceptable  Postoperative Nausea and Vomiting: none        No notable events documented.

## 2024-02-23 ENCOUNTER — OFFICE VISIT (OUTPATIENT)
Dept: ORTHOPEDIC SURGERY | Facility: CLINIC | Age: 59
End: 2024-02-23
Payer: COMMERCIAL

## 2024-02-23 DIAGNOSIS — S83.231D COMPLEX TEAR OF MEDIAL MENISCUS OF RIGHT KNEE AS CURRENT INJURY, SUBSEQUENT ENCOUNTER: Primary | ICD-10-CM

## 2024-02-23 PROCEDURE — 1036F TOBACCO NON-USER: CPT | Performed by: ORTHOPAEDIC SURGERY

## 2024-02-23 PROCEDURE — 99024 POSTOP FOLLOW-UP VISIT: CPT | Performed by: ORTHOPAEDIC SURGERY

## 2024-02-23 NOTE — PROGRESS NOTES
History of Present Illness  Patient returns today noting minimal pain.  Denies any calf pain or shortness of breath.     Exam  Mild effusion  Healthy incisions - no active drainage  Good range of motion  No calf swelling  Negative Shira´s test  Distal neurovascular exam intact     Operative Findings  Partial meniscectomy medial meniscus root, lateral meniscus mid body     Assessment  Patient status post right knee arthroscopy partial medial and lateral meniscectomy      Plan  Reviewed arthroscopic photos and findings.  Discussed short and long term implications for the knee.  Discussed analgesics, ice, rest.  Encouraged home exercise program, physical therapy.

## 2024-03-25 ENCOUNTER — APPOINTMENT (OUTPATIENT)
Dept: ORTHOPEDIC SURGERY | Facility: CLINIC | Age: 59
End: 2024-03-25
Payer: COMMERCIAL

## 2024-04-10 ENCOUNTER — APPOINTMENT (OUTPATIENT)
Dept: PRIMARY CARE | Facility: CLINIC | Age: 59
End: 2024-04-10
Payer: COMMERCIAL

## 2024-04-12 ENCOUNTER — HOSPITAL ENCOUNTER (OUTPATIENT)
Dept: RADIOLOGY | Facility: CLINIC | Age: 59
Discharge: HOME | End: 2024-04-12
Payer: COMMERCIAL

## 2024-04-12 ENCOUNTER — OFFICE VISIT (OUTPATIENT)
Dept: ORTHOPEDIC SURGERY | Facility: CLINIC | Age: 59
End: 2024-04-12
Payer: COMMERCIAL

## 2024-04-12 ENCOUNTER — OFFICE VISIT (OUTPATIENT)
Dept: PRIMARY CARE | Facility: CLINIC | Age: 59
End: 2024-04-12
Payer: COMMERCIAL

## 2024-04-12 VITALS
SYSTOLIC BLOOD PRESSURE: 129 MMHG | BODY MASS INDEX: 30.78 KG/M2 | WEIGHT: 163 LBS | DIASTOLIC BLOOD PRESSURE: 80 MMHG | HEIGHT: 61 IN | HEART RATE: 68 BPM

## 2024-04-12 DIAGNOSIS — R53.83 FATIGUE, UNSPECIFIED TYPE: ICD-10-CM

## 2024-04-12 DIAGNOSIS — E55.9 VITAMIN D DEFICIENCY: ICD-10-CM

## 2024-04-12 DIAGNOSIS — F41.9 ANXIETY: ICD-10-CM

## 2024-04-12 DIAGNOSIS — N32.81 OVERACTIVE BLADDER: ICD-10-CM

## 2024-04-12 DIAGNOSIS — Z91.89 FRAMINGHAM CARDIAC RISK <10% IN NEXT 10 YEARS: ICD-10-CM

## 2024-04-12 DIAGNOSIS — K21.9 GASTROESOPHAGEAL REFLUX DISEASE, UNSPECIFIED WHETHER ESOPHAGITIS PRESENT: ICD-10-CM

## 2024-04-12 DIAGNOSIS — M25.562 ACUTE PAIN OF LEFT KNEE: ICD-10-CM

## 2024-04-12 DIAGNOSIS — M25.562 LEFT KNEE PAIN, UNSPECIFIED CHRONICITY: ICD-10-CM

## 2024-04-12 DIAGNOSIS — R63.5 WEIGHT GAIN: ICD-10-CM

## 2024-04-12 DIAGNOSIS — Z12.4 SCREENING FOR CERVICAL CANCER: ICD-10-CM

## 2024-04-12 DIAGNOSIS — R73.9 HYPERGLYCEMIA: ICD-10-CM

## 2024-04-12 DIAGNOSIS — Z12.11 ENCOUNTER FOR SCREENING FOR MALIGNANT NEOPLASM OF COLON: ICD-10-CM

## 2024-04-12 DIAGNOSIS — E66.09 CLASS 1 OBESITY DUE TO EXCESS CALORIES WITH SERIOUS COMORBIDITY AND BODY MASS INDEX (BMI) OF 30.0 TO 30.9 IN ADULT: ICD-10-CM

## 2024-04-12 DIAGNOSIS — E78.2 HYPERCHOLESTEROLEMIA WITH HYPERTRIGLYCERIDEMIA: ICD-10-CM

## 2024-04-12 DIAGNOSIS — R60.0 BILATERAL EDEMA OF LOWER EXTREMITY: ICD-10-CM

## 2024-04-12 DIAGNOSIS — I10 ESSENTIAL HYPERTENSION: Primary | ICD-10-CM

## 2024-04-12 DIAGNOSIS — S83.242A ACUTE MEDIAL MENISCUS TEAR OF LEFT KNEE, INITIAL ENCOUNTER: Primary | ICD-10-CM

## 2024-04-12 PROBLEM — E66.811 CLASS 1 OBESITY DUE TO EXCESS CALORIES WITH SERIOUS COMORBIDITY AND BODY MASS INDEX (BMI) OF 30.0 TO 30.9 IN ADULT: Status: ACTIVE | Noted: 2024-04-12

## 2024-04-12 PROCEDURE — 73564 X-RAY EXAM KNEE 4 OR MORE: CPT | Mod: LT

## 2024-04-12 PROCEDURE — 1036F TOBACCO NON-USER: CPT | Performed by: INTERNAL MEDICINE

## 2024-04-12 PROCEDURE — 3008F BODY MASS INDEX DOCD: CPT | Performed by: INTERNAL MEDICINE

## 2024-04-12 PROCEDURE — 3079F DIAST BP 80-89 MM HG: CPT | Performed by: INTERNAL MEDICINE

## 2024-04-12 PROCEDURE — 3008F BODY MASS INDEX DOCD: CPT | Performed by: FAMILY MEDICINE

## 2024-04-12 PROCEDURE — 99214 OFFICE O/P EST MOD 30 MIN: CPT | Performed by: FAMILY MEDICINE

## 2024-04-12 PROCEDURE — 3074F SYST BP LT 130 MM HG: CPT | Performed by: INTERNAL MEDICINE

## 2024-04-12 PROCEDURE — 73564 X-RAY EXAM KNEE 4 OR MORE: CPT | Mod: LEFT SIDE | Performed by: FAMILY MEDICINE

## 2024-04-12 PROCEDURE — 99214 OFFICE O/P EST MOD 30 MIN: CPT | Performed by: INTERNAL MEDICINE

## 2024-04-12 PROCEDURE — 1036F TOBACCO NON-USER: CPT | Performed by: FAMILY MEDICINE

## 2024-04-12 PROCEDURE — 99214 OFFICE O/P EST MOD 30 MIN: CPT | Mod: 24 | Performed by: FAMILY MEDICINE

## 2024-04-12 RX ORDER — METHYLPREDNISOLONE 4 MG/1
TABLET ORAL
Qty: 1 EACH | Refills: 0 | Status: SHIPPED | OUTPATIENT
Start: 2024-04-12

## 2024-04-12 RX ORDER — CEPHRADINE 500 MG
CAPSULE ORAL
Qty: 90 CAPSULE | Refills: 1 | Status: SHIPPED | OUTPATIENT
Start: 2024-04-12

## 2024-04-12 RX ORDER — ATORVASTATIN CALCIUM 10 MG/1
10 TABLET, FILM COATED ORAL DAILY
Qty: 90 TABLET | Refills: 0 | Status: SHIPPED | OUTPATIENT
Start: 2024-04-12

## 2024-04-12 RX ORDER — AMLODIPINE BESYLATE 10 MG/1
10 TABLET ORAL DAILY
Qty: 90 TABLET | Refills: 0 | Status: SHIPPED | OUTPATIENT
Start: 2024-04-12

## 2024-04-12 RX ORDER — NAPROXEN 500 MG/1
500 TABLET ORAL
Qty: 28 TABLET | Refills: 0 | Status: SHIPPED | OUTPATIENT
Start: 2024-04-12 | End: 2024-04-26

## 2024-04-12 RX ORDER — FUROSEMIDE 20 MG/1
20 TABLET ORAL EVERY 8 HOURS PRN
Qty: 30 TABLET | Refills: 0 | Status: SHIPPED | OUTPATIENT
Start: 2024-04-12

## 2024-04-12 RX ORDER — CHOLECALCIFEROL (VITAMIN D3) 50 MCG
50 TABLET ORAL DAILY
Qty: 30 TABLET | Refills: 11 | Status: CANCELLED | OUTPATIENT
Start: 2024-04-12 | End: 2025-04-12

## 2024-04-12 RX ORDER — OMEPRAZOLE 40 MG/1
CAPSULE, DELAYED RELEASE ORAL
Qty: 90 CAPSULE | Refills: 0 | Status: SHIPPED | OUTPATIENT
Start: 2024-04-12

## 2024-04-12 RX ORDER — CITALOPRAM 10 MG/1
10 TABLET ORAL DAILY
Qty: 90 TABLET | Refills: 0 | Status: SHIPPED | OUTPATIENT
Start: 2024-04-12

## 2024-04-12 NOTE — PROGRESS NOTES
"Subjective   Patient ID: Gwen Nice is a 58 y.o. female who presents for Follow-up (Patient presented today for a 3 month follow up.).    HPI   The patient is here to allow me to reevaluate her HEMODYNAMICS, especially with history of Bilateral edema.  She is set in her ways, and is worried about \"[employer],\" and as such, is very particular about what she is taking.  She is especially worried about this because she feels that she has no business in gaining weight.  She does not eat responsibly.  She stays physically active, both at work, and when her body would allow, staying physically active.  She was very petite when younger, and her mother is not a large woman.  ENDOCRINE with no polyuria, polydipsia, polyphagia.  No blurred vision.  No skin, hair, nail changes.  No dramatic weight loss or weight gain.      She apparently has a tendency for BILATERAL EDEMA.  It is worthwhile to note that she is on AMLODIPINE.  No particular constipation.  No pam substernal chest pain, no orthopnea, no paroxysmal nocturnal dyspnea.    Compliant with medications, tolerating regimens.  Continues to want to improve quality of life, and does not wish harm to self or others.  No headache, blurred vision, diplopia.  No dysphagia.    Following closely with ORTHOPEDICS, with continued recovery from surgery, RIGHT KNEE, with now exacerbation of LEFT KNEE PAIN.  Fortunately, no falls.    Plans to reestablish soon with GYNECOLOGY, Dr. Lerma.  Otherwise, no dysuria, flank, suprapubic pain.  No malodor.  No apparent blood or mucus in urine.    States that without OMEPRAZOLE, she can easily succumb to DYSPEPTIC symptoms.  Otherwise, no particular cough, no particular sputum production.  No skin changes.  CONSTITUTIONALLY, no fever, no chills.  No night sweats.  No lingering anorexia or nausea.  No apparent lymphadenopathy.  No apparent weight loss.        Review of Systems  Review of systems as in history of present illness, and " "otherwise, reviewed separately as well, and was unremarkable/negative/noncontributory.        Objective   /80 (BP Location: Left arm, Patient Position: Sitting, BP Cuff Size: Adult)   Pulse 68   Ht 1.549 m (5' 1\")   Wt 73.9 kg (163 lb)   BMI 30.80 kg/m²     Physical Exam  In otherwise good spirits.  Not in distress or diaphoresis.  Alert, oriented x 3.  Amiable.  Not unkempt.  Looking forward to improving overall quality of life, and does not wish harm to self or others.  Obese build, but remaining independent and capable.    HEAD pink palpebral conjunctivae, anicteric sclerae.  NECK supple, no apparent jugular venous distention.  CARDIOVASCULAR not in distress or diaphoresis.  No bipedal edema.  LUNGS not in distress or diaphoresis.  Not using accessory muscles.  ABDOMEN soft, nontender.  BACK no costovertebral angle tenderness.  EXTREMITIES no clubbing, no cyanosis.  NEURO no facial asymmetry.  No apparent cranial nerve deficits.  Romberg negative.  Ambulating without need of assistance.  No apparent focal weakness.  No tremors.  PSYCH receptive, appropriate, and eager to maintain and improve quality of life.        LABORATORY results reviewed with patient.        Assessment/Plan   Diagnoses and all orders for this visit:  Essential hypertension  -     Follow Up In Primary Care - Established  -     amLODIPine (Norvasc) 10 mg tablet; Take 1 tablet (10 mg) by mouth once daily.  -     Follow Up In Primary Care - Established; Future  -     CBC and Auto Differential; Future  -     Comprehensive Metabolic Panel; Future  -     TSH with reflex to Free T4 if abnormal; Future  -     Magnesium; Future  -     Urinalysis with Reflex Culture and Microscopic; Future  Hypercholesterolemia with hypertriglyceridemia  -     atorvastatin (Lipitor) 10 mg tablet; Take 1 tablet (10 mg) by mouth once daily.  -     Follow Up In Primary Care - Established; Future  -     Comprehensive Metabolic Panel; Future  -     Lipid Panel; " Future  Hyperglycemia  -     Follow Up In Primary Care - Established; Future  -     Comprehensive Metabolic Panel; Future  -     Urinalysis with Reflex Culture and Microscopic; Future  -     Hemoglobin A1C; Future  [place] cardiac risk <10% in next 10 years  -     Follow Up In Primary Care - Established; Future  -     Comprehensive Metabolic Panel; Future  -     Hemoglobin A1C; Future  -     Lipid Panel; Future  Vitamin D deficiency  -     cholecalciferol, vitamin D3, 250 mcg (10,000 unit) capsule; Please take 1 capsule by mouth with LUNCH every day.  Always with food please.  Thank you.  -     Follow Up In Primary Care - [facility]; Future  -     Comprehensive Metabolic Panel; Future  -     Vitamin D 25-Hydroxy,Total (for eval of Vitamin D levels); Future  Overactive bladder  -     Follow Up In Primary Care - [facility]; Future  -     Comprehensive Metabolic Panel; Future  -     Urinalysis with Reflex Culture and Microscopic; Future  Screening for cervical cancer  -     Follow Up In Primary Care - [facility]; Future  -     CBC and Auto Differential; Future  Encounter for screening for malignant neoplasm of colon  -     Referral to Gastroenterology; Future  -     Follow Up In Primary Care - [facility]; Future  -     CBC and Auto Differential; Future  Gastroesophageal reflux disease, unspecified whether esophagitis present  -     Referral to Gastroenterology; Future  -     omeprazole (PriLOSEC) 40 mg DR capsule; Please take 1 tablet by mouth before breakfast every day.  Thank you.  -     Follow Up In Primary Care - [facility]; Future  -     CBC and Auto Differential; Future  Weight gain  -     Follow Up In Primary Care [facility]; Future  -     TSH with reflex to Free T4 if abnormal; Future  Class 1 obesity due to excess calories with serious comorbidity and body mass index (BMI) of 30.0 to 30.9 in adult  -     Follow Up In Primary Care [facility]; Future  -     TSH with reflex to Free T4 if abnormal;  Future  Acute pain of left knee  -     Follow Up In Primary Care - Established; Future  -     Comprehensive Metabolic Panel; Future  Anxiety  -     citalopram (CeleXA) 10 mg tablet; Take 1 tablet (10 mg) by mouth once daily.  -     Follow Up In Primary Care - Established; Future  -     Comprehensive Metabolic Panel; Future  -     TSH with reflex to Free T4 if abnormal; Future  -     Vitamin B12; Future  -     Folate; Future  Bilateral edema of lower extremity  -     furosemide (Lasix) 20 mg tablet; Take 1 tablet (20 mg) by mouth every 8 hours if needed (Bilateral edema.).  -     Follow Up In Primary Care - Established; Future  -     Comprehensive Metabolic Panel; Future  -     TSH with reflex to Free T4 if abnormal; Future  Fatigue, unspecified type  -     CBC and Auto Differential; Future  -     Comprehensive Metabolic Panel; Future  -     TSH with reflex to Free T4 if abnormal; Future  -     Magnesium; Future  -     Urinalysis with Reflex Culture and Microscopic; Future  -     Vitamin B12; Future  -     Folate; Future       Thank you for much for coming.  I am very happy to see you.    Your blood pressure seems controlled at this time, and you are tolerating your AMLODIPINE.  If you start to experience more leg swelling, please let me know, because the amlodipine could be contributing to this issue.    I do understand that you have been taking FUROSEMIDE for quite some time.  This only takes care of the tendency for leg swelling, but we have not had any good explanation as to why this is happening.  The most likely cause would be AMLODIPINE.  Again, if this becomes a problem, we can make the necessary changes.    I reviewed your FASTING laboratory results from JANUARY, and you seem to be doing well.  It will be time to repeat these again in JULY, 6 months.    In the meantime, I have refilled your ATORVASTATIN.  Take at bedtime please.    You have also had LOW vitamin D.  It is worthwhile to note that vitamin D  contributes to bone health!  It can also help with weight management by improving your metabolism.  VITAMIN D3 1000 units with LUNCH every day.  Always with food please.    Please keep your appointment with Dr. Lerma, GYNECOLOGY.  It is time for your Pap/pelvic, and he will talk to you about your overactive bladder and your tendency for weight gain.    You will also benefit from seeing GASTROENTEROLOGY.  You are due for a COLONOSCOPY, and it will be worthwhile to reevaluate your tendency for REFLUX.  In the meantime, take your OMEPRAZOLE before breakfast every morning.  Try to avoid trigger food like garlic, onions, black coffee, dark chocolate, tomatoes, salsa.  Likewise, try to avoid laying down until 4 hours after your last meal.    Please come back in 3 months.  Let us have you do FASTING laboratory examinations via [facility]/ in [place], then see me soon after.  We can also discuss options regarding weight management.    Until then, drink lots of fluids throughout the day and avoid salt.  Good for blood pressure control, keeping your kidneys healthy, and minimizing leg swelling.    See you in 3 months.  Call sooner as needed.  I hope you had a good Easter Sunday, and I do wish you a happy spring and summer!    I am sorry to hear about your LEFT KNEE.  Please go ahead and have yourself evaluated via the walk-in clinic downstairs, first floor, ORTHOPEDICS, so that Dr. Welsh can eventually hear about how you are doing as well.            0  Return in 3 months.  40 minutes please.  FASTING laboratory examinations, then see me for COMPLETE physical examination.  Consider options regarding weight management.  Coordinate with orthopedics, cardiology, gynecology.  Reassess mood, energy, function, tendency for weight gain.            0  Debility, hemodynamics noted.  Patient frustrated by weight gain as well.  Understands that we can discuss options regarding weight management, but in the meantime, she would like to get  other things taken care of, have her left knee evaluated so that she can exercise more, have gynecology evaluate her tendency for weight gain, and hopefully, with the benefit of repeat FASTING laboratory examinations, try to determine other causes of weight gain.    Discussed potentially discontinuing FUROSEMIDE.  Currently, no signs of fluid overload.  Patient mildly resistant to any changes, including decreasing or replacing amlodipine, especially with history of bilateral edema.  Following with CARDIOLOGY as well.            0

## 2024-04-12 NOTE — PATIENT INSTRUCTIONS
Thank you for much for coming.  I am very happy to see you.    Your blood pressure seems controlled at this time, and you are tolerating your AMLODIPINE.  If you start to experience more leg swelling, please let me know, because the amlodipine could be contributing to this issue.    I do understand that you have been taking FUROSEMIDE for quite some time.  This only takes care of the tendency for leg swelling, but we have not had any good explanation as to why this is happening.  The most likely cause would be AMLODIPINE.  Again, if this becomes a problem, we can make the necessary changes.    I reviewed your FASTING laboratory results from JANUARY, and you seem to be doing well.  It will be time to repeat these again in JULY, 6 months.    In the meantime, I have refilled your ATORVASTATIN.  Take at bedtime please.    You have also had LOW vitamin D.  It is worthwhile to note that vitamin D contributes to bone health!  It can also help with weight management by improving your metabolism.  VITAMIN D3 1000 units with LUNCH every day.  Always with food please.    Please keep your appointment with Dr. Lerma, GYNECOLOGY.  It is time for your Pap/pelvic, and he will talk to you about your overactive bladder and your tendency for weight gain.    You will also benefit from seeing GASTROENTEROLOGY.  You are due for a COLONOSCOPY, and it will be worthwhile to reevaluate your tendency for REFLUX.  In the meantime, take your OMEPRAZOLE before breakfast every morning.  Try to avoid trigger food like garlic, onions, black coffee, dark chocolate, tomatoes, salsa.  Likewise, try to avoid laying down until 4 hours after your last meal.    Please come back in 3 months.  Let us have you do FASTING laboratory examinations via [facility]/ in [place], then see me soon after.  We can also discuss options regarding weight management.    Until then, drink lots of fluids throughout the day and avoid salt.  Good for blood pressure control,  keeping your kidneys healthy, and minimizing leg swelling.    See you in 3 months.  Call sooner as needed.  I hope you had a good Easter Sunday, and I do wish you a happy spring and summer!    I am sorry to hear about your LEFT KNEE.  Please go ahead and have yourself evaluated via the walk-in clinic downstairs, first floor, ORTHOPEDICS, so that Dr. Welsh can eventually hear about how you are doing as well.            0  Return in 3 months.  40 minutes please.  FASTING laboratory examinations, then see me for COMPLETE physical examination.  Consider options regarding weight management.  Coordinate with orthopedics, cardiology, gynecology.  Reassess mood, energy, function, tendency for weight gain.            0

## 2024-04-14 NOTE — PROGRESS NOTES
Acute Injury New Patient Visit    CC:   Chief Complaint   Patient presents with    Left Knee - Pain       HPI: Gwen is a 58 y.o.female who presents today with new complaints of 2 weeks of acute worsening pain and discomfort to the inside of her left knee.  She status post right knee meniscus repair with Dr. Welsh.  She states unfortunately she has been putting a lot of extra pressure on her left leg and knee.  She thought it would go away however it has not.  She has significant difficulty with full weightbearing also trouble sleeping at night.  She points to the inside of the left knee as area most discomfort.        Review of Systems   GENERAL: Negative for malaise, significant weight loss, fever  MUSCULOSKELETAL: See HPI  NEURO: Negative for numbness / tingling     Past Medical History  Past Medical History:   Diagnosis Date    Anxiety     Depression     GERD (gastroesophageal reflux disease)     Hyperlipidemia     Hypertension     Joint pain     PONV (postoperative nausea and vomiting)     Postmenopausal     Sinusitis     Stress incontinence        Medication review  Medication Documentation Review Audit       Reviewed by Cole C Budinsky, MD (Physician) on 04/14/24 at 1328      Medication Order Taking? Sig Documenting Provider Last Dose Status   Discontinued 04/12/24 1418   amLODIPine (Norvasc) 10 mg tablet [id number]  Take 1 tablet (10 mg) by mouth once daily. Felton Urbano MD  Active   Discontinued 04/12/24 1418   atorvastatin (Lipitor) 10 mg tablet [id number]  Take 1 tablet (10 mg) by mouth once daily. Felton Urbano MD  Active   cholecalciferol, vitamin D3, 250 mcg (10,000 unit) capsule [id number]  Please take 1 capsule by mouth with LUNCH every day.  Always with food please.  Thank you. Felton Urbano MD  Active   Discontinued 04/12/24 1418   citalopram (CeleXA) 10 mg tablet [id number]  Take 1 tablet (10 mg) by mouth once daily. Felton Urbano MD  Active   Discontinued  04/12/24 1418   furosemide (Lasix) 20 mg tablet [id number]  Take 1 tablet (20 mg) by mouth every 8 hours if needed (Bilateral edema.). Felton Urbano MD  Active    mg tablet [id number] No TAKE ONE TABLET BY MOUTH THREE TIMES A DAY WITH FOOD as needed Elkin Cotter PA-C Taking Active   methylPREDNISolone (Medrol Dospak) 4 mg tablets [id number]  Follow schedule on package instructions Cole C Budinsky, MD  Active   naproxen (Naprosyn) 500 mg tablet [id number]  Take 1 tablet (500 mg) by mouth 2 times a day with meals for 14 days. Cole C Budinsky, MD  Active   Discontinued 04/12/24 1418   omeprazole (PriLOSEC) 40 mg DR capsule [id number]  Please take 1 tablet by mouth before breakfast every day.  Thank you. Felton Urbano MD  Active   oxybutynin XL (Ditropan-XL) 10 mg 24 hr tablet [id number] No TAKE ONE TABLET BY MOUTH EVERY DAY   Patient not taking: Reported on 4/12/2024    Radames Lerma MD Not Taking Active                    Allergies  Allergies   Allergen Reactions    Aripiprazole Anaphylaxis    Bupropion Anaphylaxis and Swelling    Vancomycin Nausea And Vomiting     As of 5/25/20    Bee Venom Protein (Honey Bee) Swelling    Piperacillin-Tazobactam Swelling    Sulfa (Sulfonamide Antibiotics) Hives       Social History  Social History     Socioeconomic History    Marital status:      Spouse name: Not on file    Number of children: Not on file    Years of education: Not on file    Highest education level: Not on file   Occupational History    Not on file   Tobacco Use    Smoking status: Never    Smokeless tobacco: Never   Vaping Use    Vaping status: Never Used   Substance and Sexual Activity    Alcohol use: Yes     Alcohol/week: 21.0 standard drinks of alcohol     Types: 21 Shots of liquor per week     Comment: daily 2-3 drinks liquor    Drug use: Yes     Types: Marijuana     Comment: LAST USED MARIJUANA 2 DAYS, SMOKED    Sexual activity: Defer   Other Topics Concern    Not on file    Social History Narrative    Not on file     Social Determinants of Health     Financial Resource Strain: Not on file   Food Insecurity: Not on file   Transportation Needs: Not on file   Physical Activity: Not on file   Stress: Not on file   Social Connections: Not on file   Intimate Partner Violence: Not on file   Housing Stability: Not on file       Surgical History  Past Surgical History:   Procedure Laterality Date    CATARACT EXTRACTION Bilateral     NASAL SEPTUM SURGERY      OTHER SURGICAL HISTORY  12/04/2020    Breast augmentation    OTHER SURGICAL HISTORY  12/04/2020    Rhinoplasty    OTHER SURGICAL HISTORY Right     Ulnar collateral ligament of thumb rupture repair    TUBAL LIGATION         Physical Exam:  GENERAL:  Patient is awake, alert, and oriented to person place and time.  Patient appears well nourished and well kept.  Affect Calm, Not Acutely Distressed.  HEENT:  Normocephalic, Atraumatic, EOMI  CARDIOVASCULAR:  Hemodynamically stable.  RESPIRATORY:  Normal respirations with unlabored breathing.  NEURO: Gross sensation intact to the lower extremities bilaterally.  Extremity: Left knee exam: The affected knee was examined and inspected and was tender to the touch along the medial and lateral aspect with catching, locking or mechanical symptoms. The skin was intact without breakdown or open wound. Old incisions if present were healed. There was a mild Radha exam seen with some evidence of instability & weakness in the collateral ligaments with varus/valgus stress & laxity in the anterior or posterior planes. There was a negative Lachman´s test, pivot shift test and posterior drawer sign with no foot drop, numbness or tingling. Sensation, reflexes and pulses in the foot and ankle are preserved. There was an effusion. Range of motion showed good straight leg raise with flexion to 110 degrees and extension to 0 degrees. The patient had the ability to bear weight, but with discomfort. The patient´s  gait was antalgic secondary to the discomfort.      Diagnostics: X-rays as below  XR knee left 4+ views          Interpreted By:  Budinsky, Cole,   STUDY:  XR KNEE LEFT 4+ VIEWS; ;  4/12/2024 3:21 pm      INDICATION:  Signs/Symptoms:Pain.      ACCESSION NUMBER(S):  [id number]      ORDERING CLINICIAN:  COLE BUDINSKY      IMPRESSION:  Left knee films demonstrate no obvious presence for acute fracture or  dislocation. Sclerotic changes of the medial joint space with  narrowing and some mild osteophytes seen. No obvious presence for  acute fracture or dislocation seen. Question small suprapatellar  joint effusion.          Signed by: Cole Budinsky 4/12/2024 5:27 PM  Dictation workstation:   [id number]             Procedure: None  Procedures    Assessment:   Problem List Items Addressed This Visit    None  Visit Diagnoses       Acute medial meniscus tear of left knee, initial encounter    -  Primary    Relevant Medications    methylPREDNISolone (Medrol Dospak) 4 mg tablets    naproxen (Naprosyn) 500 mg tablet    Other Relevant Orders    MR knee left wo IV contrast    Left knee pain, unspecified chronicity        Relevant Orders    XR knee left 4+ views (Completed)             Plan: At this time patient will be provided with a short course of oral steroid pack anti-inflammatory medication as well.  Recommended icing and relative rest.  She can continue with the brace to her left knee if she would like, however did not need to take 1 today.  Will obtain an MRI of the left knee for further evaluation due to concern for meniscus tear given her mechanical symptoms and joint line pain today.  She will follow-up with her surgeon Dr. Elkin Welsh for further evaluation management once the MRI is completed or as previously scheduled for follow-up of her right knee surgery..  Orders Placed This Encounter    XR knee left 4+ views    MR knee left wo IV contrast    methylPREDNISolone (Medrol Dospak) 4 mg tablets    naproxen  (Naprosyn) 500 mg tablet      At the conclusion of the visit there were no further questions by the patient/family regarding their plan of care.  Patient was instructed to call or return with any issues, questions, or concerns regarding their injury and/or treatment plan described above.     04/14/24 at 1:28 PM - Cole C Budinsky, MD    Office: [phone]    This note was prepared using voice recognition software.  The details of this note are correct and have been reviewed, and corrected to the best of my ability.  Some grammatical errors may persist related to the Dragon software.

## 2024-04-19 ENCOUNTER — OFFICE VISIT (OUTPATIENT)
Dept: ORTHOPEDIC SURGERY | Facility: CLINIC | Age: 59
End: 2024-04-19
Payer: COMMERCIAL

## 2024-04-19 DIAGNOSIS — M23.91 INTERNAL DERANGEMENT OF RIGHT KNEE: Primary | ICD-10-CM

## 2024-04-19 PROCEDURE — 20610 DRAIN/INJ JOINT/BURSA W/O US: CPT | Mod: LT | Performed by: ORTHOPAEDIC SURGERY

## 2024-04-19 PROCEDURE — 99213 OFFICE O/P EST LOW 20 MIN: CPT | Mod: 25 | Performed by: ORTHOPAEDIC SURGERY

## 2024-04-19 PROCEDURE — 99024 POSTOP FOLLOW-UP VISIT: CPT | Performed by: ORTHOPAEDIC SURGERY

## 2024-04-19 PROCEDURE — 2500000005 HC RX 250 GENERAL PHARMACY W/O HCPCS: Performed by: ORTHOPAEDIC SURGERY

## 2024-04-19 PROCEDURE — 99214 OFFICE O/P EST MOD 30 MIN: CPT | Performed by: ORTHOPAEDIC SURGERY

## 2024-04-19 PROCEDURE — 3008F BODY MASS INDEX DOCD: CPT | Performed by: ORTHOPAEDIC SURGERY

## 2024-04-19 PROCEDURE — 2500000004 HC RX 250 GENERAL PHARMACY W/ HCPCS (ALT 636 FOR OP/ED): Performed by: ORTHOPAEDIC SURGERY

## 2024-04-19 NOTE — PROGRESS NOTES
History of Present Illness:  She reports that her right knee was doing good.     She states on Easter she was walking across some grass when her left knee locked up on her.  Left knee is a moderately uncomfortable.  She had a hard time walking on a Tuesday.  Deftly bothers to swelling.    Exam:  Mild effusion  Healthy incisions - no active drainage  Good range of motion  No calf swelling  Negative Shira´s test  Distal neurovascular exam intact    Left knee: 5/5 quad strength.  Moderate swelling is trace effusion.  Pain along the medial joint line.  Positive Radha positive Apley grind.  Normal stability     Assessment:  Patient status post right knee arthroscopy partial medial and lateral meniscectomy   Left knee pain     Plan:  Discussed short and long term implications for the knee.  Discussed analgesics, ice, rest.  Encouraged home exercise program, physical therapy.   Ice and ibuprofen to her left knee.  She has mechanical symptoms persist beyond injection will get MRI for potential meniscus tear.  Follow-up in 3 weeks   L Inj/Asp: L knee on 4/21/2024 9:52 PM  Indications: pain  Details: 22 G needle, anterolateral approach  Medications: 40 mg triamcinolone acetonide 40 mg/mL; 8 mL lidocaine 10 mg/mL (1 %)  Outcome: tolerated well, no immediate complications  Procedure, treatment alternatives, risks and benefits explained, specific risks discussed. Consent was given by the patient. Immediately prior to procedure a time out was called to verify the correct patient, procedure, equipment, support staff and site/side marked as required. Patient was prepped and draped in the usual sterile fashion.             Past Medical History:   Diagnosis Date    Anxiety     Depression     GERD (gastroesophageal reflux disease)     Hyperlipidemia     Hypertension     Joint pain     PONV (postoperative nausea and vomiting)     Postmenopausal     Sinusitis     Stress incontinence        Medication Documentation Review Audit        Reviewed by Cole C Budinsky, MD (Physician) on 04/14/24 at 1328      Medication Order Taking? Sig Documenting Provider Last Dose Status   Discontinued 04/12/24 1418   amLODIPine (Norvasc) 10 mg tablet [id number]  Take 1 tablet (10 mg) by mouth once daily. Felton Urbano MD  Active   Discontinued 04/12/24 1418   atorvastatin (Lipitor) 10 mg tablet [id number]  Take 1 tablet (10 mg) by mouth once daily. Felton Urbano MD  Active   cholecalciferol, vitamin D3, 250 mcg (10,000 unit) capsule [id number]  Please take 1 capsule by mouth with LUNCH every day.  Always with food please.  Thank you. Felton Urbano MD  Active   Discontinued 04/12/24 1418   citalopram (CeleXA) 10 mg tablet [id number]  Take 1 tablet (10 mg) by mouth once daily. Felton Urbano MD  Active   Discontinued 04/12/24 1418   furosemide (Lasix) 20 mg tablet [id number]  Take 1 tablet (20 mg) by mouth every 8 hours if needed (Bilateral edema.). Felton Urbano MD  Active    mg tablet [id number] No TAKE ONE TABLET BY MOUTH THREE TIMES A DAY WITH FOOD as needed Elkin Cotter PA-C Taking Active   methylPREDNISolone (Medrol Dospak) 4 mg tablets [id number]  Follow schedule on package instructions Cole C Budinsky, MD  Active   naproxen (Naprosyn) 500 mg tablet [id number]  Take 1 tablet (500 mg) by mouth 2 times a day with meals for 14 days. Cole C Budinsky, MD  Active   Discontinued 04/12/24 1418   omeprazole (PriLOSEC) 40 mg DR capsule [id number]  Please take 1 tablet by mouth before breakfast every day.  Thank you. Felton Urbano MD  Active   oxybutynin XL (Ditropan-XL) 10 mg 24 hr tablet [id number] No TAKE ONE TABLET BY MOUTH EVERY DAY   Patient not taking: Reported on 4/12/2024    Radames Lerma MD Not Taking Active                    Allergies   Allergen Reactions    Aripiprazole Anaphylaxis    Bupropion Anaphylaxis and Swelling    Vancomycin Nausea And Vomiting     As of 5/25/20    Bee Venom Protein (Honey  Bee) Swelling    Piperacillin-Tazobactam Swelling    Sulfa (Sulfonamide Antibiotics) Hives       Social History     Socioeconomic History    Marital status:      Spouse name: Not on file    Number of children: Not on file    Years of education: Not on file    Highest education level: Not on file   Occupational History    Not on file   Tobacco Use    Smoking status: Never    Smokeless tobacco: Never   Vaping Use    Vaping status: Never Used   Substance and Sexual Activity    Alcohol use: Yes     Alcohol/week: 21.0 standard drinks of alcohol     Types: 21 Shots of liquor per week     Comment: daily 2-3 drinks liquor    Drug use: Yes     Types: Marijuana     Comment: LAST USED MARIJUANA 2 DAYS, SMOKED    Sexual activity: Defer   Other Topics Concern    Not on file   Social History Narrative    Not on file     Social Determinants of Health     Financial Resource Strain: Not on file   Food Insecurity: Not on file   Transportation Needs: Not on file   Physical Activity: Not on file   Stress: Not on file   Social Connections: Not on file   Intimate Partner Violence: Not on file   Housing Stability: Not on file       Past Surgical History:   Procedure Laterality Date    CATARACT EXTRACTION Bilateral     NASAL SEPTUM SURGERY      OTHER SURGICAL HISTORY  12/04/2020    Breast augmentation    OTHER SURGICAL HISTORY  12/04/2020    Rhinoplasty    OTHER SURGICAL HISTORY Right     Ulnar collateral ligament of thumb rupture repair    TUBAL LIGATION         Scribe Attestation  By signing my name below, Nicolle PIMENTEL Scribpavithra   attest that this documentation has been prepared under the direction and in the presence of Elkin Welsh MD.

## 2024-04-21 PROCEDURE — 20610 DRAIN/INJ JOINT/BURSA W/O US: CPT | Performed by: ORTHOPAEDIC SURGERY

## 2024-04-21 RX ORDER — TRIAMCINOLONE ACETONIDE 40 MG/ML
40 INJECTION, SUSPENSION INTRA-ARTICULAR; INTRAMUSCULAR
Status: COMPLETED | OUTPATIENT
Start: 2024-04-21 | End: 2024-04-21

## 2024-04-21 RX ORDER — LIDOCAINE HYDROCHLORIDE 10 MG/ML
8 INJECTION INFILTRATION; PERINEURAL
Status: COMPLETED | OUTPATIENT
Start: 2024-04-21 | End: 2024-04-21

## 2024-04-21 RX ADMIN — LIDOCAINE HYDROCHLORIDE 8 ML: 10 INJECTION, SOLUTION INFILTRATION; PERINEURAL at 21:52

## 2024-04-21 RX ADMIN — TRIAMCINOLONE ACETONIDE 40 MG: 40 INJECTION, SUSPENSION INTRA-ARTICULAR; INTRAMUSCULAR at 21:52

## 2024-05-06 ENCOUNTER — HOSPITAL ENCOUNTER (OUTPATIENT)
Dept: RADIOLOGY | Facility: HOSPITAL | Age: 59
Discharge: HOME | End: 2024-05-06
Payer: COMMERCIAL

## 2024-05-06 DIAGNOSIS — S83.242A ACUTE MEDIAL MENISCUS TEAR OF LEFT KNEE, INITIAL ENCOUNTER: ICD-10-CM

## 2024-05-06 PROCEDURE — 73721 MRI JNT OF LWR EXTRE W/O DYE: CPT | Mod: LT

## 2024-05-06 PROCEDURE — 73721 MRI JNT OF LWR EXTRE W/O DYE: CPT | Mod: LEFT SIDE | Performed by: RADIOLOGY

## 2024-05-09 NOTE — PROGRESS NOTES
History of Present Illness:  DOS: 02/14/24 Right knee arthroscopy, medial meniscectomy. She had an injection of her left knee on 04/19/24. She had an MRI on 05/06/24. She states that she is unable to go down the stairs. 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 She takes ibuprofen for pain management.     She states on Easter she was walking across some grass when her left knee locked up on her.  Left knee is a moderately uncomfortable.  She had a hard time walking on a Tuesday.  Deftly bothers to swelling.    Imaging:   MRI: Shows complex tear of medial meniscus and small lateral meniscus.     Exam:  No calf swelling  Negative Shira´s test  Distal neurovascular exam intact    Left knee: 5/5 quad strength.   Flexion to 110   Pain along the medial joint line.    1+ effusion  Positive Radha positive Apley grind.    Normal stability     Assessment:  Left knee pain     Plan:  Discussed short and long term implications for the knee.  Discussed analgesics, ice, rest.  Encouraged home exercise program, physical therapy.   Ice 60 minutes a day   Ibuprofen 800 mg sent to her pharmacy  Knee brace  Discussed left knee arthroscopy with medial meniscectomy and lateral meniscectomy. As of right now she would like to defer surgery until the fall for financial reasons.  We have discussed doing this at her convenience as well as long-term complications.    Left knee arthroscopy with partial medial and lateral menisectomy plan of care:  Risks benefits and alternatives to surgery were discussed including but not limited to Infection, bleeding, neurovascular injury, pain and dysfunction, hardware related complications including cutout failure breakage, loss of function, motion, and permanent disability as well as the cardiovascular and pulmonary complications from anesthesia including death and DVT. Patient and family accept these risks.  We discussed specifically post meniscectomy pain, incomplete pain relief,  meniscus retear, progressive arthritis, intraoperative decisions in regards to other pathology, and the potential for future revision surgeries including arthroplasty    Plan for outpatient surgery   1. 1 week postop follow up   2. Percocet for postop pain relief. OARRS has been reviewed and is consistent with prescribed medications. This report is scanned into the electronic medical record. The risks of abuse, dependence, addiction and diversion were considered. The medication is felt to be clinically appropriate based on documented diagnosis .  3.  Pre-CERT for removal postoperative knee brace        Past Medical History:   Diagnosis Date    Anxiety     Depression     GERD (gastroesophageal reflux disease)     Hyperlipidemia     Hypertension     Joint pain     PONV (postoperative nausea and vomiting)     Postmenopausal     Sinusitis     Stress incontinence        Medication Documentation Review Audit       Reviewed by Cole C Budinsky, MD (Physician) on 04/14/24 at 1328      Medication Order Taking? Sig Documenting Provider Last Dose Status   Discontinued 04/12/24 1418   amLODIPine (Norvasc) 10 mg tablet [id number]  Take 1 tablet (10 mg) by mouth once daily. Felton Urbano MD  Active   Discontinued 04/12/24 1418   atorvastatin (Lipitor) 10 mg tablet [id number]  Take 1 tablet (10 mg) by mouth once daily. Felton Urbano MD  Active   cholecalciferol, vitamin D3, 250 mcg (10,000 unit) capsule [id number]  Please take 1 capsule by mouth with LUNCH every day.  Always with food please.  Thank you. Felton Urbano MD  Active   Discontinued 04/12/24 1418   citalopram (CeleXA) 10 mg tablet [id number]  Take 1 tablet (10 mg) by mouth once daily. Felton Urbano MD  Active   Discontinued 04/12/24 1418   furosemide (Lasix) 20 mg tablet [id number]  Take 1 tablet (20 mg) by mouth every 8 hours if needed (Bilateral edema.). Felton Urbano MD  Active    mg tablet [id number] No TAKE ONE TABLET BY  MOUTH THREE TIMES A DAY WITH FOOD as needed Elkin Cotter PA-C Taking Active   methylPREDNISolone (Medrol Dospak) 4 mg tablets [id number]  Follow schedule on package instructions Cole C Budinsky, MD  Active   naproxen (Naprosyn) 500 mg tablet [id number]  Take 1 tablet (500 mg) by mouth 2 times a day with meals for 14 days. Cole C Budinsky, MD  Active   Discontinued 04/12/24 1418   omeprazole (PriLOSEC) 40 mg DR capsule [id number]  Please take 1 tablet by mouth before breakfast every day.  Thank you. Felton Urbano MD  Active   oxybutynin XL (Ditropan-XL) 10 mg 24 hr tablet [id number] No TAKE ONE TABLET BY MOUTH EVERY DAY   Patient not taking: Reported on 4/12/2024    Radames Lerma MD Not Taking Active                    Allergies   Allergen Reactions    Aripiprazole Anaphylaxis    Bupropion Anaphylaxis and Swelling    Vancomycin Nausea And Vomiting     As of 5/25/20    Bee Venom Protein (Honey Bee) Swelling    Piperacillin-Tazobactam Swelling    Sulfa (Sulfonamide Antibiotics) Hives       Social History     Socioeconomic History    Marital status:      Spouse name: Not on file    Number of children: Not on file    Years of education: Not on file    Highest education level: Not on file   Occupational History    Not on file   Tobacco Use    Smoking status: Never    Smokeless tobacco: Never   Vaping Use    Vaping status: Never Used   Substance and Sexual Activity    Alcohol use: Yes     Alcohol/week: 21.0 standard drinks of alcohol     Types: 21 Shots of liquor per week     Comment: daily 2-3 drinks liquor    Drug use: Yes     Types: Marijuana     Comment: LAST USED MARIJUANA 2 DAYS, SMOKED    Sexual activity: Defer   Other Topics Concern    Not on file   Social History Narrative    Not on file     Social Determinants of Health     Financial Resource Strain: Not on file   Food Insecurity: Not on file   Transportation Needs: Not on file   Physical Activity: Not on file   Stress: Not on file    Social Connections: Not on file   Intimate Partner Violence: Not on file   Housing Stability: Not on file       Past Surgical History:   Procedure Laterality Date    CATARACT EXTRACTION Bilateral     NASAL SEPTUM SURGERY      OTHER SURGICAL HISTORY  12/04/2020    Breast augmentation    OTHER SURGICAL HISTORY  12/04/2020    Rhinoplasty    OTHER SURGICAL HISTORY Right     Ulnar collateral ligament of thumb rupture repair    TUBAL LIGATION         Scribe Attestation  By signing my name below, INicolle , Scrskip   attest that this documentation has been prepared under the direction and in the presence of Elkin Welsh MD.

## 2024-05-10 ENCOUNTER — OFFICE VISIT (OUTPATIENT)
Dept: ORTHOPEDIC SURGERY | Facility: CLINIC | Age: 59
End: 2024-05-10
Payer: COMMERCIAL

## 2024-05-10 DIAGNOSIS — M25.562 LEFT KNEE PAIN, UNSPECIFIED CHRONICITY: ICD-10-CM

## 2024-05-10 PROCEDURE — 99214 OFFICE O/P EST MOD 30 MIN: CPT | Performed by: ORTHOPAEDIC SURGERY

## 2024-05-10 PROCEDURE — 3008F BODY MASS INDEX DOCD: CPT | Performed by: ORTHOPAEDIC SURGERY

## 2024-05-10 RX ORDER — IBUPROFEN 800 MG/1
800 TABLET ORAL EVERY 8 HOURS PRN
Qty: 90 TABLET | Refills: 0 | Status: SHIPPED | OUTPATIENT
Start: 2024-05-10 | End: 2024-06-09

## 2024-05-30 ENCOUNTER — OFFICE VISIT (OUTPATIENT)
Dept: OBSTETRICS AND GYNECOLOGY | Facility: CLINIC | Age: 59
End: 2024-05-30
Payer: COMMERCIAL

## 2024-05-30 VITALS — WEIGHT: 155 LBS | DIASTOLIC BLOOD PRESSURE: 80 MMHG | BODY MASS INDEX: 29.29 KG/M2 | SYSTOLIC BLOOD PRESSURE: 132 MMHG

## 2024-05-30 DIAGNOSIS — R23.2 VASOMOTOR FLUSHING: Primary | ICD-10-CM

## 2024-05-30 DIAGNOSIS — N39.41 URGE INCONTINENCE: ICD-10-CM

## 2024-05-30 DIAGNOSIS — Z79.890 HORMONE REPLACEMENT THERAPY: ICD-10-CM

## 2024-05-30 DIAGNOSIS — N95.1 VASOMOTOR SYMPTOMS DUE TO MENOPAUSE: ICD-10-CM

## 2024-05-30 PROCEDURE — 3079F DIAST BP 80-89 MM HG: CPT | Performed by: OBSTETRICS & GYNECOLOGY

## 2024-05-30 PROCEDURE — 99214 OFFICE O/P EST MOD 30 MIN: CPT | Performed by: OBSTETRICS & GYNECOLOGY

## 2024-05-30 PROCEDURE — 3008F BODY MASS INDEX DOCD: CPT | Performed by: OBSTETRICS & GYNECOLOGY

## 2024-05-30 PROCEDURE — 3075F SYST BP GE 130 - 139MM HG: CPT | Performed by: OBSTETRICS & GYNECOLOGY

## 2024-05-30 RX ORDER — ESTRADIOL 0.03 MG/D
1 PATCH TRANSDERMAL
Qty: 4 PATCH | Refills: 12 | Status: SHIPPED | OUTPATIENT
Start: 2024-06-02

## 2024-05-30 RX ORDER — OXYBUTYNIN CHLORIDE 10 MG/1
10 TABLET, EXTENDED RELEASE ORAL DAILY
Qty: 90 TABLET | Refills: 0 | Status: CANCELLED | OUTPATIENT
Start: 2024-05-30

## 2024-05-30 RX ORDER — PROGESTERONE 100 MG/1
100 CAPSULE ORAL NIGHTLY
Qty: 90 CAPSULE | Refills: 4 | Status: SHIPPED | OUTPATIENT
Start: 2024-05-30

## 2024-05-30 RX ORDER — OXYBUTYNIN CHLORIDE 10 MG/1
10 TABLET, EXTENDED RELEASE ORAL DAILY
Qty: 90 TABLET | Refills: 3 | Status: SHIPPED | OUTPATIENT
Start: 2024-05-30

## 2024-05-30 ASSESSMENT — PATIENT HEALTH QUESTIONNAIRE - PHQ9
1. LITTLE INTEREST OR PLEASURE IN DOING THINGS: NOT AT ALL
2. FEELING DOWN, DEPRESSED OR HOPELESS: NOT AT ALL
SUM OF ALL RESPONSES TO PHQ9 QUESTIONS 1 & 2: 0

## 2024-06-03 NOTE — PROGRESS NOTES
GYN PROGRESS NOTE          Chief complaint: follow up    HPI:  Patient answers are not available for this visit.  HPI       Menopause     Additional comments: Est pt             Comments    Pap 2021 wnl  Shon 2024   Pt had hot flashes went away came back after a year night sweats, hot flashes, joint aches, retains water, weight gain,   Thinks her hormones are off           Last edited by Desiree Batista MA on 5/30/2024  3:59 PM.          Reviewed case with patient, reviewed plans.    Patient on calcium channel blocker and Lasix and oxybutynin can cause some of current symptoms    Reviewed the low risk with hormone replacement worth trial based on multiple symptoms    ROS:  GEN - no fevers or chills  RESP - no SOB or cough  GYN - see HPI      HISTORY:  Past Medical History:   Diagnosis Date    Anxiety     Depression     GERD (gastroesophageal reflux disease)     Hyperlipidemia     Hypertension     Joint pain     PONV (postoperative nausea and vomiting)     Postmenopausal     Sinusitis     Stress incontinence      Past Surgical History:   Procedure Laterality Date    CATARACT EXTRACTION Bilateral     CONDYLOMA EXCISION/FULGURATION      ENDOMETRIAL ABLATION      NASAL SEPTUM SURGERY      OTHER SURGICAL HISTORY  12/04/2020    Breast augmentation    OTHER SURGICAL HISTORY  12/04/2020    Rhinoplasty    OTHER SURGICAL HISTORY Right     Ulnar collateral ligament of thumb rupture repair    VT BREAST AUGMENTATION WITH IMPLANT      TUBAL LIGATION       Social History     Socioeconomic History    Marital status:      Spouse name: Not on file    Number of children: Not on file    Years of education: Not on file    Highest education level: Not on file   Occupational History    Not on file   Tobacco Use    Smoking status: Former     Current packs/day: 0.25     Average packs/day: 0.3 packs/day for 3.0 years (0.8 ttl pk-yrs)     Types: Cigarettes    Smokeless tobacco: Never   Vaping Use    Vaping status: Never Used   Substance  and Sexual Activity    Alcohol use: Yes     Alcohol/week: 8.0 standard drinks of alcohol     Types: 8 Standard drinks or equivalent per week     Comment: daily 2-3 drinks liquor    Drug use: Not Currently     Types: Marijuana     Comment: LAST USED MARIJUANA 2 DAYS, SMOKED    Sexual activity: Not Currently     Partners: Male     Birth control/protection: Post-menopausal   Other Topics Concern    Not on file   Social History Narrative    Not on file     Social Determinants of Health     Financial Resource Strain: Not on file   Food Insecurity: Not on file   Transportation Needs: Not on file   Physical Activity: Not on file   Stress: Not on file   Social Connections: Not on file   Intimate Partner Violence: Not on file   Housing Stability: Not on file     Cancer-related family history includes Cancer in her father.       PHYSICAL EXAM:  /80   Wt 70.3 kg (155 lb)   BMI 29.29 kg/m²   GEN:  A&O, NAD  HEENT:  head HC/AT, no visible goiter  PSYCH:  normal affect, non-anxious      IMPRESSION/PLAN:    58-year-old worsening vasomotor symptoms  Swelling on calcium channel blocker and Lasix    Trial of combination HRT follow-up to discuss efficacy        Radames Lerma MD

## 2024-06-17 ENCOUNTER — APPOINTMENT (OUTPATIENT)
Dept: GASTROENTEROLOGY | Facility: CLINIC | Age: 59
End: 2024-06-17
Payer: COMMERCIAL

## 2024-07-08 ENCOUNTER — APPOINTMENT (OUTPATIENT)
Dept: OBSTETRICS AND GYNECOLOGY | Facility: CLINIC | Age: 59
End: 2024-07-08
Payer: COMMERCIAL

## 2024-07-14 ENCOUNTER — APPOINTMENT (OUTPATIENT)
Dept: RADIOLOGY | Facility: HOSPITAL | Age: 59
End: 2024-07-14
Payer: COMMERCIAL

## 2024-07-14 ENCOUNTER — HOSPITAL ENCOUNTER (EMERGENCY)
Facility: HOSPITAL | Age: 59
Discharge: HOME | End: 2024-07-14
Attending: EMERGENCY MEDICINE
Payer: COMMERCIAL

## 2024-07-14 VITALS
HEART RATE: 80 BPM | BODY MASS INDEX: 27.6 KG/M2 | SYSTOLIC BLOOD PRESSURE: 135 MMHG | WEIGHT: 150 LBS | HEIGHT: 62 IN | OXYGEN SATURATION: 97 % | DIASTOLIC BLOOD PRESSURE: 81 MMHG | TEMPERATURE: 97.3 F | RESPIRATION RATE: 18 BRPM

## 2024-07-14 DIAGNOSIS — M70.62 TROCHANTERIC BURSITIS OF LEFT HIP: Primary | ICD-10-CM

## 2024-07-14 PROCEDURE — 73502 X-RAY EXAM HIP UNI 2-3 VIEWS: CPT | Mod: LT

## 2024-07-14 PROCEDURE — 73502 X-RAY EXAM HIP UNI 2-3 VIEWS: CPT | Mod: LEFT SIDE | Performed by: RADIOLOGY

## 2024-07-14 PROCEDURE — 99283 EMERGENCY DEPT VISIT LOW MDM: CPT

## 2024-07-14 RX ORDER — METHYLPREDNISOLONE 4 MG/1
4 TABLET ORAL DAILY
Qty: 7 TABLET | Refills: 0 | Status: SHIPPED | OUTPATIENT
Start: 2024-07-14 | End: 2024-07-21

## 2024-07-14 RX ORDER — HYDROCODONE BITARTRATE AND ACETAMINOPHEN 5; 325 MG/1; MG/1
1 TABLET ORAL EVERY 4 HOURS PRN
Qty: 18 TABLET | Refills: 0 | Status: SHIPPED | OUTPATIENT
Start: 2024-07-14 | End: 2024-07-17

## 2024-07-14 ASSESSMENT — LIFESTYLE VARIABLES
HAVE PEOPLE ANNOYED YOU BY CRITICIZING YOUR DRINKING: NO
EVER HAD A DRINK FIRST THING IN THE MORNING TO STEADY YOUR NERVES TO GET RID OF A HANGOVER: NO
EVER FELT BAD OR GUILTY ABOUT YOUR DRINKING: NO
TOTAL SCORE: 0
HAVE YOU EVER FELT YOU SHOULD CUT DOWN ON YOUR DRINKING: NO

## 2024-07-14 ASSESSMENT — PAIN SCALES - GENERAL
PAINLEVEL_OUTOF10: 8
PAINLEVEL_OUTOF10: 0 - NO PAIN

## 2024-07-14 ASSESSMENT — COLUMBIA-SUICIDE SEVERITY RATING SCALE - C-SSRS
6. HAVE YOU EVER DONE ANYTHING, STARTED TO DO ANYTHING, OR PREPARED TO DO ANYTHING TO END YOUR LIFE?: NO
2. HAVE YOU ACTUALLY HAD ANY THOUGHTS OF KILLING YOURSELF?: NO
1. IN THE PAST MONTH, HAVE YOU WISHED YOU WERE DEAD OR WISHED YOU COULD GO TO SLEEP AND NOT WAKE UP?: NO

## 2024-07-14 ASSESSMENT — PAIN - FUNCTIONAL ASSESSMENT: PAIN_FUNCTIONAL_ASSESSMENT: 0-10

## 2024-07-14 ASSESSMENT — PAIN DESCRIPTION - LOCATION: LOCATION: HIP

## 2024-07-14 NOTE — ED PROVIDER NOTES
HPI   Chief Complaint   Patient presents with    Hip Pain     Left hip pain from injury at work       Patient is a 58-year-old female with a history of anxiety depression hypertension comes in complaining of left hip pain, started on Wednesday when she started a job which required twisting repetitive action and ibuprofen was helping but now the pain is worse hurts to move hurts to walk, no falls no injuries no numbness no tingling                          No data recorded                   Patient History   Past Medical History:   Diagnosis Date    Anxiety     Depression     GERD (gastroesophageal reflux disease)     Hyperlipidemia     Hypertension     Joint pain     PONV (postoperative nausea and vomiting)     Postmenopausal     Sinusitis     Stress incontinence      Past Surgical History:   Procedure Laterality Date    CATARACT EXTRACTION Bilateral     CONDYLOMA EXCISION/FULGURATION      ENDOMETRIAL ABLATION      NASAL SEPTUM SURGERY      OTHER SURGICAL HISTORY  12/04/2020    Breast augmentation    OTHER SURGICAL HISTORY  12/04/2020    Rhinoplasty    OTHER SURGICAL HISTORY Right     Ulnar collateral ligament of thumb rupture repair    HI BREAST AUGMENTATION WITH IMPLANT      TUBAL LIGATION       Family History   Problem Relation Name Age of Onset    Arthritis Mother Lena     Cancer Father Peter      Social History     Tobacco Use    Smoking status: Former     Current packs/day: 0.25     Average packs/day: 0.3 packs/day for 3.0 years (0.8 ttl pk-yrs)     Types: Cigarettes    Smokeless tobacco: Never   Vaping Use    Vaping status: Never Used   Substance Use Topics    Alcohol use: Yes     Alcohol/week: 8.0 standard drinks of alcohol     Types: 8 Standard drinks or equivalent per week     Comment: daily 2-3 drinks liquor    Drug use: Not Currently     Types: Marijuana     Comment: LAST USED MARIJUANA 2 DAYS, SMOKED       Physical Exam   ED Triage Vitals [07/14/24 1400]   Temperature Heart Rate Respirations BP   36.3  °C (97.3 °F) 83 18 137/87      Pulse Ox Temp Source Heart Rate Source Patient Position   97 % Temporal Monitor Sitting      BP Location FiO2 (%)     Right arm --       Physical Exam  Vitals and nursing note reviewed.   Constitutional:       Appearance: Normal appearance.   Musculoskeletal:         General: Tenderness present.      Comments: Tenderness to palpation left lateral trochanter, full range of motion of the left, neurovascularly intact distal to the injury   Skin:     General: Skin is warm.   Neurological:      Mental Status: She is alert.         ED Course & MDM   Diagnoses as of 07/14/24 1542   Trochanteric bursitis of left hip       Medical Decision Making  My differential diagnosis  Acute trochanteric bursitis, hip strain  Order x-ray of the left hip following which further disposition will be done    Was interpreted by me as negative for any acute fracture dislocation, at this time patient was reassured diagnosed with trochanteric bursitis left hip given prescription for Vicodin Medrol Dosepak given 2 days off from work and advised to follow with orthopedics if symptoms do not get better.        Procedure  Procedures     Yue Brandt MD  07/14/24 [id number]

## 2024-07-14 NOTE — Clinical Note
Gwen Nice was seen and treated in our emergency department on 7/14/2024.  She may return to work on 07/17/2024.       If you have any questions or concerns, please don't hesitate to call.      Yue Brandt MD

## 2024-07-15 ENCOUNTER — OFFICE VISIT (OUTPATIENT)
Dept: ORTHOPEDIC SURGERY | Facility: CLINIC | Age: 59
End: 2024-07-15
Payer: COMMERCIAL

## 2024-07-15 VITALS — WEIGHT: 150 LBS | HEIGHT: 62 IN | BODY MASS INDEX: 27.6 KG/M2

## 2024-07-15 DIAGNOSIS — S76.019A HIP STRAIN, INITIAL ENCOUNTER: ICD-10-CM

## 2024-07-15 DIAGNOSIS — S83.242A ACUTE MEDIAL MENISCUS TEAR OF LEFT KNEE: Primary | ICD-10-CM

## 2024-07-15 DIAGNOSIS — S39.012A LUMBAR STRAIN, INITIAL ENCOUNTER: ICD-10-CM

## 2024-07-15 DIAGNOSIS — M70.62 TROCHANTERIC BURSITIS OF LEFT HIP: ICD-10-CM

## 2024-07-15 PROCEDURE — 99213 OFFICE O/P EST LOW 20 MIN: CPT | Performed by: INTERNAL MEDICINE

## 2024-07-15 PROCEDURE — 3008F BODY MASS INDEX DOCD: CPT | Performed by: INTERNAL MEDICINE

## 2024-07-15 PROCEDURE — 99214 OFFICE O/P EST MOD 30 MIN: CPT | Performed by: INTERNAL MEDICINE

## 2024-07-15 PROCEDURE — 1036F TOBACCO NON-USER: CPT | Performed by: INTERNAL MEDICINE

## 2024-07-15 ASSESSMENT — PATIENT HEALTH QUESTIONNAIRE - PHQ9
2. FEELING DOWN, DEPRESSED OR HOPELESS: NOT AT ALL
1. LITTLE INTEREST OR PLEASURE IN DOING THINGS: NOT AT ALL
SUM OF ALL RESPONSES TO PHQ9 QUESTIONS 1 AND 2: 0

## 2024-07-15 NOTE — PROGRESS NOTES
Acute Injury New Patient Visit    CC: No chief complaint on file.      HPI: Gwen is a 58 y.o. female presents today for evaluation for acute left hip injury sustained last week after she moved a heavy concrete barrel. She notes pain on the lateral aspect of the left hip. She went to the ER where x-rays were taken and she was informed that she had a left hip bursitis. She is here for initial evaluation and x-rays.  She was given oral steroids which she started yesterday, and is feeling some relief.        Review of Systems   GENERAL: Negative for malaise, significant weight loss, fever  MUSCULOSKELETAL: See HPI  NEURO:  Negative for numbness / tingling     Past Medical History  Past Medical History:   Diagnosis Date    Anxiety     Depression     GERD (gastroesophageal reflux disease)     Hyperlipidemia     Hypertension     Joint pain     PONV (postoperative nausea and vomiting)     Postmenopausal     Sinusitis     Stress incontinence        Medication review  Medication Documentation Review Audit       Reviewed by Sai Bravo RN (Registered Nurse) on 07/14/24 at 1402      Medication Order Taking? Sig Documenting Provider Last Dose Status   amLODIPine (Norvasc) 10 mg tablet [id number]  Take 1 tablet (10 mg) by mouth once daily. Felton Urbano MD  Active   atorvastatin (Lipitor) 10 mg tablet [id number]  Take 1 tablet (10 mg) by mouth once daily. Felton Urbano MD  Active   cholecalciferol, vitamin D3, 250 mcg (10,000 unit) capsule [id number]  Please take 1 capsule by mouth with LUNCH every day.  Always with food please.  Thank you. Felton Urbano MD  Active   citalopram (CeleXA) 10 mg tablet [id number]  Take 1 tablet (10 mg) by mouth once daily. Felton Urbano MD  Active   estradiol (Climara) 0.025 mg/24 hr patch [id number]  Place 1 patch over 7 days on the skin 1 (one) time per week. Radames Lerma MD  Active   furosemide (Lasix) 20 mg tablet [id number]  Take 1 tablet (20 mg) by  mouth every 8 hours if needed (Bilateral edema.). Felton Urbano MD  Active   methylPREDNISolone (Medrol Dospak) 4 mg tablets [id number]  Follow schedule on package instructions Cole C Budinsky, MD  Active   omeprazole (PriLOSEC) 40 mg DR capsule [id number]  Please take 1 tablet by mouth before breakfast every day.  Thank you. Felton Urbano MD  Active   oxybutynin XL (Ditropan-XL) 10 mg 24 hr tablet [id number]  Take 1 tablet (10 mg) by mouth once daily. Radames Lerma MD  Active   progesterone (Prometrium) 100 mg capsule [id number]  Take 1 capsule (100 mg) by mouth once daily at bedtime. Radamse Lerma MD  Active                    Allergies  Allergies   Allergen Reactions    Aripiprazole Anaphylaxis    Bupropion Anaphylaxis and Swelling    Vancomycin Nausea And Vomiting     As of 5/25/20    Bee Venom Protein (Honey Bee) Swelling    Piperacillin-Tazobactam Swelling    Sulfa (Sulfonamide Antibiotics) Hives       Social History  Social History     Socioeconomic History    Marital status:      Spouse name: Not on file    Number of children: Not on file    Years of education: Not on file    Highest education level: Not on file   Occupational History    Not on file   Tobacco Use    Smoking status: Former     Current packs/day: 0.25     Average packs/day: 0.3 packs/day for 3.0 years (0.8 ttl pk-yrs)     Types: Cigarettes    Smokeless tobacco: Never   Vaping Use    Vaping status: Never Used   Substance and Sexual Activity    Alcohol use: Yes     Alcohol/week: 8.0 standard drinks of alcohol     Types: 8 Standard drinks or equivalent per week     Comment: daily 2-3 drinks liquor    Drug use: Not Currently     Types: Marijuana     Comment: LAST USED MARIJUANA 2 DAYS, SMOKED    Sexual activity: Not Currently     Partners: Male     Birth control/protection: Post-menopausal   Other Topics Concern    Not on file   Social History Narrative    Not on file     Social Determinants of Health      Financial Resource Strain: Not on file   Food Insecurity: Not on file   Transportation Needs: Not on file   Physical Activity: Not on file   Stress: Not on file   Social Connections: Not on file   Intimate Partner Violence: Not on file   Housing Stability: Not on file       Surgical History  Past Surgical History:   Procedure Laterality Date    CATARACT EXTRACTION Bilateral     CONDYLOMA EXCISION/FULGURATION      ENDOMETRIAL ABLATION      NASAL SEPTUM SURGERY      OTHER SURGICAL HISTORY  12/04/2020    Breast augmentation    OTHER SURGICAL HISTORY  12/04/2020    Rhinoplasty    OTHER SURGICAL HISTORY Right     Ulnar collateral ligament of thumb rupture repair    DC BREAST AUGMENTATION WITH IMPLANT      TUBAL LIGATION         Physical Exam:  GENERAL:  Patient is awake, alert, and oriented to person place and time.  Patient appears well nourished and well kept.  Affect Calm, Not Acutely Distressed.  HEENT:  Normocephalic, Atraumatic, EOMI  CARDIOVASCULAR:  Hemodynamically stable.  RESPIRATORY:  Normal respirations with unlabored breathing.  Extremity: Left hip shows skin is intact.  She can flex the left hip to 90 degrees, no pain with internal or external rotation.  Mild pain in the left trochanteric bursa.  Most of her pain is over the gluteal muscles.  Quadricep strength is 5 /5 on the right and left.  Right hip was examined for comparison.    Lumbar spine examination shows skin is intact.  There is no midline lumbar tenderness.  Mild pain of the left SI joint.  Pain with forward flexion and reverse extension lumbar spine.  Pain with left and right lateral rotation.  Negative straight leg test on the right and left leg.  She is able to walk on her tiptoes and heels with no difficulties.      Diagnostics: X-rays reviewed  XR hip left with pelvis when performed 2 or 3 views  Narrative: STUDY:  Hip Radiographs; 7/14/2024 at 2:49 PM  INDICATION:  Left hip pain.  COMPARISON:  None Available.  ACCESSION  NUMBER(S):  [id number]  ORDERING CLINICIAN:  CARLOS ALMANZAR  TECHNIQUE:  Two view(s) of the left hip.  FINDINGS:    There is no displaced fracture.  The alignment is anatomic.  No soft  tissue abnormality is seen.  Impression: No acute osseous abnormality.  Signed by Harry Adams MD      Procedure: None    Assessment:   Left trochanteric bursitis  Left hip strain/tendinitis  Lumbar strain    Plan: Gwen presents today for left hip and back pain secondary to left hip strain/tendinitis with lumbar strain.  She is getting relief from the oral steroids given emergency room, we recommended physical therapy and Medrol dose Ulises.  Will have her return to work on Wednesday.  If there is no improvement may consider further imaging.    No orders of the defined types were placed in this encounter.     At the conclusion of the visit there were no further questions by the patient/family regarding their plan of care.  Patient was instructed to call or return with any issues, questions, or concerns regarding their injury and/or treatment plan described above.     07/15/24 at 2:33 PM - Sharri Young MD  Scribe Attestation  By signing my name below, I, Alessandro Harjeet, Scribpavithra   attest that this documentation has been prepared under the direction and in the presence of Sharri Young MD.    Office: [phone]    This note was prepared using voice recognition software.  The details of this note are correct and have been reviewed, and corrected to the best of my ability.  Some grammatical errors may persist related to the Dragon software.

## 2024-07-15 NOTE — LETTER
July 15, 2024     Patient: Gwen Nice   YOB: 1965   Date of Visit: 7/15/2024       To Whom It May Concern:    It is my medical opinion that Gwen Nice may return to work on 7/17/2024 .    If you have any questions or concerns, please don't hesitate to call.         Sincerely,        Sharri Young MD    CC: No Recipients

## 2024-07-17 ENCOUNTER — TELEPHONE (OUTPATIENT)
Dept: ORTHOPEDIC SURGERY | Facility: CLINIC | Age: 59
End: 2024-07-17
Payer: COMMERCIAL

## 2024-07-17 NOTE — TELEPHONE ENCOUNTER
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 If not, left number for patient to call back and make a brace fitting

## 2024-07-19 PROBLEM — S83.282A OTHER TEAR OF LATERAL MENISCUS, CURRENT INJURY, LEFT KNEE, INITIAL ENCOUNTER: Status: ACTIVE | Noted: 2024-07-15

## 2024-07-21 DIAGNOSIS — K21.9 GASTROESOPHAGEAL REFLUX DISEASE, UNSPECIFIED WHETHER ESOPHAGITIS PRESENT: ICD-10-CM

## 2024-07-23 ENCOUNTER — OFFICE VISIT (OUTPATIENT)
Dept: ORTHOPEDIC SURGERY | Facility: CLINIC | Age: 59
End: 2024-07-23
Payer: COMMERCIAL

## 2024-07-23 ENCOUNTER — HOSPITAL ENCOUNTER (OUTPATIENT)
Dept: RADIOLOGY | Facility: CLINIC | Age: 59
Discharge: HOME | End: 2024-07-23
Payer: COMMERCIAL

## 2024-07-23 DIAGNOSIS — F41.9 ANXIETY: ICD-10-CM

## 2024-07-23 DIAGNOSIS — R60.0 BILATERAL EDEMA OF LOWER EXTREMITY: ICD-10-CM

## 2024-07-23 DIAGNOSIS — E78.2 HYPERCHOLESTEROLEMIA WITH HYPERTRIGLYCERIDEMIA: ICD-10-CM

## 2024-07-23 DIAGNOSIS — I10 ESSENTIAL HYPERTENSION: ICD-10-CM

## 2024-07-23 DIAGNOSIS — M54.50 LOW BACK PAIN, UNSPECIFIED BACK PAIN LATERALITY, UNSPECIFIED CHRONICITY, UNSPECIFIED WHETHER SCIATICA PRESENT: Primary | ICD-10-CM

## 2024-07-23 DIAGNOSIS — M54.50 LOW BACK PAIN, UNSPECIFIED BACK PAIN LATERALITY, UNSPECIFIED CHRONICITY, UNSPECIFIED WHETHER SCIATICA PRESENT: ICD-10-CM

## 2024-07-23 DIAGNOSIS — M54.16 LUMBAR RADICULOPATHY: ICD-10-CM

## 2024-07-23 PROCEDURE — 99214 OFFICE O/P EST MOD 30 MIN: CPT | Performed by: ORTHOPAEDIC SURGERY

## 2024-07-23 PROCEDURE — 72110 X-RAY EXAM L-2 SPINE 4/>VWS: CPT | Performed by: ORTHOPAEDIC SURGERY

## 2024-07-23 PROCEDURE — 72120 X-RAY BEND ONLY L-S SPINE: CPT

## 2024-07-23 RX ORDER — FUROSEMIDE 20 MG/1
20 TABLET ORAL EVERY 8 HOURS PRN
Qty: 30 TABLET | Refills: 0 | Status: SHIPPED | OUTPATIENT
Start: 2024-07-23

## 2024-07-23 RX ORDER — AMLODIPINE BESYLATE 10 MG/1
10 TABLET ORAL DAILY
Qty: 30 TABLET | Refills: 0 | Status: SHIPPED | OUTPATIENT
Start: 2024-07-23

## 2024-07-23 RX ORDER — CITALOPRAM 10 MG/1
10 TABLET ORAL DAILY
Qty: 30 TABLET | Refills: 0 | Status: SHIPPED | OUTPATIENT
Start: 2024-07-23

## 2024-07-23 RX ORDER — ATORVASTATIN CALCIUM 10 MG/1
10 TABLET, FILM COATED ORAL DAILY
Qty: 30 TABLET | Refills: 0 | Status: SHIPPED | OUTPATIENT
Start: 2024-07-23

## 2024-07-23 RX ORDER — OMEPRAZOLE 40 MG/1
CAPSULE, DELAYED RELEASE ORAL
Qty: 90 CAPSULE | Refills: 0 | Status: SHIPPED | OUTPATIENT
Start: 2024-07-23

## 2024-07-23 NOTE — PROGRESS NOTES
Gwen Nice is a 58 y.o. female who presents for New Patient Visit of the Lower Back (Pain into left leg/X-rays today).    HPI:  58-year-old female here for new patient visit of low back pain and left leg pain.  She denies any fever chills nausea vomiting night sweats.  She has no bowel or bladder complaints.    Physical exam:  Well-nourished, well kept.No lymphangitis or lymphadenopathy in the examined extremities.  Gait normal.  Can stand on heels and toes.   Examination of the back shows tenderness in the paraspinous musculature in the left lumbosacral and SI area.  There is mildly decreased range of motion in all directions due to guarding/muscle spasms and pain at extremes.  There is good strength and no instability.  Examination of the lower extremities reveals no point tenderness, swelling, or deformity.  Range of motion of the hips, knees, and ankles are full without crepitance, instability, or exacerbation of pain.  Strength is 5/5 throughout.  No redness, abrasions, or lesions on extremities  Gross sensation intact in the extremities.  Deep tendon reflexes 1+ bilateral. Clonus negative.  Affect normal.  Alert and oriented ×3.  Coordination normal.    Imaging studies:  We ordered and reviewed AP lateral flexion-extension plain films of the lumbar spine today.    Assessment:  58-year-old female here for evaluation of left low back pain and a little left leg pain.  Overall her back pain is what bothers her most she is also getting a little left SI joint pain as well.  She does have a little bit of left lateral calf pain but nothing in the upper portion of the leg itself.  No involvement with the foot.  No significant symptoms on the right side.  This all started about 3 weeks ago when she was at work as a  trying to spin heavy objects.  She saw Dr. Young who ordered physical therapy and treated her with pain medication and steroids, the pain medication and steroids did not help.  She has not yet  started physical therapy.  No history of back surgery.    We have ordered and reviewed test, x-rays.  I reviewed the note from Dr. Young from July 15, 2024 that discusses her back pain.  This is an undiagnosed new problem with uncertain prognosis that is affecting her bodily function and her ability to do her job.    For complete plan and/or surgical details, please refer to Dr. Kimble's portion of this split dictation.    -Chan Cote PA-C    In a face-to-face encounter, I performed a history and physical examination, discussed pertinent diagnostic studies if indicated, and discussed diagnosis and management strategies with both the patient and the midlevel provider.  I reviewed the midlevel's note and agree with the documented findings and plan of care.    Patient with back pain in the SI joint area on the left, left buttock and down the left leg to the shin and calf area.  Nothing on the right side.  This started 3 weeks ago when she was at work trying to spin heavy objects.  She has been backing off with the aggressive activities at work but it still not helping.  She saw Dr. Young and then came to see us.  She has an L4-5 spondylolisthesis visualized on her x-rays.  She does not have an MRI.  She has physical therapy ordered but has not done any.  She has no history of back surgery or issues.  This is a mechanical back pain with a left leg radiculopathy which is a new acute problem of uncertain prognosis.  We are going to get her into physical therapy and get an MRI of her lumbar spine.  We have ordered and reviewed x-rays on her today.  We have also reviewed Dr. Young's notes.    Kevin Kimble MD  Orthopedic surgery

## 2024-07-26 ENCOUNTER — TELEPHONE (OUTPATIENT)
Dept: ORTHOPEDIC SURGERY | Facility: CLINIC | Age: 59
End: 2024-07-26
Payer: COMMERCIAL

## 2024-07-26 NOTE — TELEPHONE ENCOUNTER
Patient called asking if Paper work can be filled out to be off work. She is scheduled for surgery with Dr. Welsh for 08/07/2024 but cannot work because of the injury at work. Discussed with kevon, she can bring in the paper work and we will take a look. Patient will bring in.

## 2024-07-31 ENCOUNTER — TELEPHONE (OUTPATIENT)
Dept: ORTHOPEDIC SURGERY | Facility: CLINIC | Age: 59
End: 2024-07-31
Payer: COMMERCIAL

## 2024-07-31 NOTE — TELEPHONE ENCOUNTER
Patient called asking about paperwork and if it was ready . I called and LVM for patient stating paperwork is filled out just waiting for Dr. Welsh to sign and we will call her to  .

## 2024-08-01 NOTE — PREPROCEDURE INSTRUCTIONS
Reviewed pre-op instructions with patient including NPO after midnight, must have , hospital and check in location, and day of surgery routine. Patient aware to come to [facility] for Pre-op Labs/EKG prior to procedure.

## 2024-08-02 ENCOUNTER — HOSPITAL ENCOUNTER (OUTPATIENT)
Dept: CARDIOLOGY | Facility: HOSPITAL | Age: 59
Discharge: HOME | End: 2024-08-02
Payer: COMMERCIAL

## 2024-08-02 ENCOUNTER — LAB (OUTPATIENT)
Dept: LAB | Facility: HOSPITAL | Age: 59
End: 2024-08-02
Payer: COMMERCIAL

## 2024-08-02 DIAGNOSIS — S83.242A OTHER TEAR OF MEDIAL MENISCUS, CURRENT INJURY, LEFT KNEE, INITIAL ENCOUNTER: ICD-10-CM

## 2024-08-02 LAB
ALBUMIN SERPL BCP-MCNC: 4.4 G/DL (ref 3.4–5)
ALP SERPL-CCNC: 58 U/L (ref 33–110)
ALT SERPL W P-5'-P-CCNC: 17 U/L (ref 7–45)
ANION GAP SERPL CALC-SCNC: 12 MMOL/L (ref 10–20)
APTT PPP: 28 SECONDS (ref 27–38)
AST SERPL W P-5'-P-CCNC: 14 U/L (ref 9–39)
ATRIAL RATE: 71 BPM
BASOPHILS # BLD AUTO: 0.06 X10*3/UL (ref 0–0.1)
BASOPHILS NFR BLD AUTO: 1.1 %
BILIRUB SERPL-MCNC: 0.6 MG/DL (ref 0–1.2)
BUN SERPL-MCNC: 20 MG/DL (ref 6–23)
CALCIUM SERPL-MCNC: 9.6 MG/DL (ref 8.6–10.3)
CHLORIDE SERPL-SCNC: 106 MMOL/L (ref 98–107)
CO2 SERPL-SCNC: 27 MMOL/L (ref 21–32)
CREAT SERPL-MCNC: 0.82 MG/DL (ref 0.5–1.05)
EGFRCR SERPLBLD CKD-EPI 2021: 83 ML/MIN/1.73M*2
EOSINOPHIL # BLD AUTO: 0.11 X10*3/UL (ref 0–0.7)
EOSINOPHIL NFR BLD AUTO: 2.1 %
ERYTHROCYTE [DISTWIDTH] IN BLOOD BY AUTOMATED COUNT: 12.3 % (ref 11.5–14.5)
GLUCOSE SERPL-MCNC: 112 MG/DL (ref 74–99)
HCT VFR BLD AUTO: 36.5 % (ref 36–46)
HGB BLD-MCNC: 12.7 G/DL (ref 12–16)
IMM GRANULOCYTES # BLD AUTO: 0.02 X10*3/UL (ref 0–0.7)
IMM GRANULOCYTES NFR BLD AUTO: 0.4 % (ref 0–0.9)
INR PPP: 0.9 (ref 0.9–1.1)
LYMPHOCYTES # BLD AUTO: 1.67 X10*3/UL (ref 1.2–4.8)
LYMPHOCYTES NFR BLD AUTO: 31.4 %
MCH RBC QN AUTO: 31.4 PG (ref 26–34)
MCHC RBC AUTO-ENTMCNC: 34.8 G/DL (ref 32–36)
MCV RBC AUTO: 90 FL (ref 80–100)
MONOCYTES # BLD AUTO: 0.47 X10*3/UL (ref 0.1–1)
MONOCYTES NFR BLD AUTO: 8.8 %
NEUTROPHILS # BLD AUTO: 2.99 X10*3/UL (ref 1.2–7.7)
NEUTROPHILS NFR BLD AUTO: 56.2 %
NRBC BLD-RTO: 0 /100 WBCS (ref 0–0)
P AXIS: 64 DEGREES
P OFFSET: 201 MS
P ONSET: 157 MS
PLATELET # BLD AUTO: 293 X10*3/UL (ref 150–450)
POTASSIUM SERPL-SCNC: 3.9 MMOL/L (ref 3.5–5.3)
PR INTERVAL: 144 MS
PROT SERPL-MCNC: 6.7 G/DL (ref 6.4–8.2)
PROTHROMBIN TIME: 10.6 SECONDS (ref 9.8–12.8)
Q ONSET: 229 MS
QRS COUNT: 11 BEATS
QRS DURATION: 82 MS
QT INTERVAL: 388 MS
QTC CALCULATION(BAZETT): 421 MS
QTC FREDERICIA: 410 MS
R AXIS: 1 DEGREES
RBC # BLD AUTO: 4.04 X10*6/UL (ref 4–5.2)
SODIUM SERPL-SCNC: 141 MMOL/L (ref 136–145)
T AXIS: 36 DEGREES
T OFFSET: 423 MS
VENTRICULAR RATE: 71 BPM
WBC # BLD AUTO: 5.3 X10*3/UL (ref 4.4–11.3)

## 2024-08-02 PROCEDURE — 93005 ELECTROCARDIOGRAM TRACING: CPT

## 2024-08-02 PROCEDURE — 84075 ASSAY ALKALINE PHOSPHATASE: CPT

## 2024-08-02 PROCEDURE — 85610 PROTHROMBIN TIME: CPT

## 2024-08-02 PROCEDURE — 85025 COMPLETE CBC W/AUTO DIFF WBC: CPT

## 2024-08-02 PROCEDURE — 36415 COLL VENOUS BLD VENIPUNCTURE: CPT

## 2024-08-03 ENCOUNTER — HOSPITAL ENCOUNTER (OUTPATIENT)
Dept: RADIOLOGY | Facility: CLINIC | Age: 59
Discharge: HOME | End: 2024-08-03
Payer: COMMERCIAL

## 2024-08-03 DIAGNOSIS — M54.50 LOW BACK PAIN, UNSPECIFIED BACK PAIN LATERALITY, UNSPECIFIED CHRONICITY, UNSPECIFIED WHETHER SCIATICA PRESENT: ICD-10-CM

## 2024-08-03 DIAGNOSIS — M54.16 LUMBAR RADICULOPATHY: ICD-10-CM

## 2024-08-03 PROCEDURE — 72148 MRI LUMBAR SPINE W/O DYE: CPT | Performed by: RADIOLOGY

## 2024-08-03 PROCEDURE — 72148 MRI LUMBAR SPINE W/O DYE: CPT

## 2024-08-05 ENCOUNTER — TELEPHONE (OUTPATIENT)
Dept: ORTHOPEDIC SURGERY | Facility: CLINIC | Age: 59
End: 2024-08-05

## 2024-08-05 LAB
ATRIAL RATE: 71 BPM
P AXIS: 64 DEGREES
P OFFSET: 201 MS
P ONSET: 157 MS
PR INTERVAL: 144 MS
Q ONSET: 229 MS
QRS COUNT: 11 BEATS
QRS DURATION: 82 MS
QT INTERVAL: 388 MS
QTC CALCULATION(BAZETT): 421 MS
QTC FREDERICIA: 410 MS
R AXIS: 1 DEGREES
T AXIS: 36 DEGREES
T OFFSET: 423 MS
VENTRICULAR RATE: 71 BPM

## 2024-08-05 NOTE — TELEPHONE ENCOUNTER
Patient is wondering if she can get a handicap placard after surgery. If so can patient be called when ready to

## 2024-08-05 NOTE — TELEPHONE ENCOUNTER
Patient called saying that her paperwork was filled out wrong for her surgery. The paperwork is stating the right knee instead of the left knee . DOS: 8/7/24      Was corrected today

## 2024-08-06 ENCOUNTER — ANESTHESIA EVENT (OUTPATIENT)
Dept: OPERATING ROOM | Facility: HOSPITAL | Age: 59
End: 2024-08-06
Payer: COMMERCIAL

## 2024-08-06 ENCOUNTER — APPOINTMENT (OUTPATIENT)
Dept: GASTROENTEROLOGY | Facility: CLINIC | Age: 59
End: 2024-08-06
Payer: COMMERCIAL

## 2024-08-06 ENCOUNTER — OFFICE VISIT (OUTPATIENT)
Dept: ORTHOPEDIC SURGERY | Facility: CLINIC | Age: 59
End: 2024-08-06
Payer: COMMERCIAL

## 2024-08-06 DIAGNOSIS — S83.242D ACUTE MEDIAL MENISCUS TEAR OF LEFT KNEE, SUBSEQUENT ENCOUNTER: Primary | ICD-10-CM

## 2024-08-06 DIAGNOSIS — M54.16 LUMBAR RADICULOPATHY: Primary | ICD-10-CM

## 2024-08-06 PROCEDURE — 99214 OFFICE O/P EST MOD 30 MIN: CPT | Performed by: ORTHOPAEDIC SURGERY

## 2024-08-06 PROCEDURE — 99215 OFFICE O/P EST HI 40 MIN: CPT | Performed by: ORTHOPAEDIC SURGERY

## 2024-08-06 RX ORDER — SODIUM CHLORIDE, SODIUM LACTATE, POTASSIUM CHLORIDE, CALCIUM CHLORIDE 600; 310; 30; 20 MG/100ML; MG/100ML; MG/100ML; MG/100ML
100 INJECTION, SOLUTION INTRAVENOUS CONTINUOUS
Status: CANCELLED | OUTPATIENT
Start: 2024-08-06

## 2024-08-06 RX ORDER — OXYCODONE HYDROCHLORIDE 5 MG/1
5 TABLET ORAL EVERY 4 HOURS PRN
Status: CANCELLED | OUTPATIENT
Start: 2024-08-06

## 2024-08-06 RX ORDER — OXYCODONE AND ACETAMINOPHEN 5; 325 MG/1; MG/1
1 TABLET ORAL EVERY 6 HOURS PRN
Qty: 20 TABLET | Refills: 0 | Status: SHIPPED | OUTPATIENT
Start: 2024-08-07 | End: 2024-08-14

## 2024-08-06 NOTE — ANESTHESIA PREPROCEDURE EVALUATION
Patient: Gwen Nice    Procedure Information       Date/Time: 08/07/24 0730    Procedure: ARTHROSCOPY MEDIAL AND LATERAL  MENISCECTOMY (Left: Knee)    Location: [facility] / [facility]    Surgeons: Elkin Welsh MD            Relevant Problems   Cardiac   (+) Essential hypertension   (+) Hypercholesterolemia with hypertriglyceridemia      Neuro   (+) Anxiety   (+) Depression with anxiety      GI   (+) Gastroesophageal reflux disease   (+) Gastroesophageal reflux disease with esophagitis without hemorrhage      Endocrine   (+) Class 1 obesity due to excess calories with serious comorbidity and body mass index (BMI) of 30.0 to 30.9 in adult      Musculoskeletal   (+) Primary osteoarthritis involving multiple joints   (+) Primary osteoarthritis of right knee      ID   (+) Felon of finger   (+) Subacute osteomyelitis of right hand (Multi)       Clinical information reviewed:    Allergies  Meds               NPO Detail:  No data recorded     Physical Exam    Airway  Mallampati: II     Cardiovascular   Rhythm: regular  Rate: normal     Dental    Pulmonary - normal exam     Abdominal - normal exam             Anesthesia Plan    History of general anesthesia?: yes  History of complications of general anesthesia?: no    ASA 2     general     intravenous induction   Postoperative administration of opioids is intended.  Anesthetic plan and risks discussed with patient.

## 2024-08-06 NOTE — PROGRESS NOTES
Gwen Nice is a 58 y.o. female who presents for Follow-up of the Lower Back (MRI done at ).    HPI:  58-year-old female here for follow-up of lumbar MRI.  She denies any fever chills nausea vomiting night sweats.  She has no bowel or bladder complaints.    Physical exam:  Well-nourished, well kept.No lymphangitis or lymphadenopathy in the examined extremities.  Gait normal.  Can stand on heels and toes.   Examination of the back shows tenderness in the paraspinous musculature.  There is decreased range of motion in all directions due to guarding/muscle spasms and pain at extremes.  There is good strength and no instability.  Examination of the lower extremities reveals no point tenderness, swelling, or deformity.  Range of motion of the hips, knees, and ankles are full without crepitance, instability, or exacerbation of pain.  Strength is 5/5 throughout.  No redness, abrasions, or lesions on extremities  Gross sensation intact in the extremities.  Affect normal.  Alert and oriented ×3.  Coordination normal.    Imaging studies:  An MRI of the lumbar spine from August 3 was reviewed today.    Assessment:  58-year-old female here for follow-up of lumbar MRI results.  She is still having back pain and left leg radicular pain which sometimes will go into the foot.  Overall the back is a little worse than the left leg but this can vary with activity.  She did physical therapy and she does not think it gave her any relief it just changed some of the nature of the pain.  She is getting pain now when she to do things that were pain-free.  Her MRI shows some general degenerative changes throughout, it looks like she may have a herniated disc foraminally on the left at L4-5, she does have a spondylolisthesis at that level as well.  This is significantly affecting her bodily function.  See previous notes for details.  She has had a recent right knee scope, she is about to undergo a left knee scope with Dr. Welsh in 2  weeks.    We have reviewed test today, MRI.  We did review the note from Dr. Najera from May 10, 2024 discussing her left knee.  We did consider and discuss surgery today.    For complete plan and/or surgical details, please refer to Dr. Kimble's portion of this split dictation.    -Chan Cote PA-C    In a face-to-face encounter, I performed a history and physical examination, discussed pertinent diagnostic studies if indicated, and discussed diagnosis and management strategies with both the patient and the midlevel provider.  I reviewed the midlevel's note and agree with the documented findings and plan of care.    Patient still with left leg radiculopathy.  He goes buttock hamstring calf foot.  She also has some left knee issues that Dr. Welsh is going to do a knee scope on for her tomorrow.  Her MRI was reviewed of her back.  It shows moderate to severe central and lateral recess stenosis at L4-5 where there is a spondylolisthesis.  There is also some stenosis at the L3-4 level in the left lateral recess.  The main issue is the L4-5 level.  I had a long discussion with the patient and explained to them that their options are 1) live with the symptoms and see how they evolve, 2) physical therapy, 3) pain management or 4) surgery.  At this point she is going to have her knee surgery tomorrow.  She is thinking of maybe having her back surgery soon after that.  We will have her follow-up with us in 3 weeks and see how she is doing with regards to her knee scope.  An operation on her will be an L4-5 TLIF with a globus retractor coming in from the left side.  We would then swing up to the L3-4 level.  She currently is severely inhibited with bodily function given her back pain left leg radiculopathy and knee issues.    Kevin Kimble MD  Orthopedic surgery

## 2024-08-07 ENCOUNTER — ANESTHESIA (OUTPATIENT)
Dept: OPERATING ROOM | Facility: HOSPITAL | Age: 59
End: 2024-08-07
Payer: COMMERCIAL

## 2024-08-07 ENCOUNTER — HOSPITAL ENCOUNTER (OUTPATIENT)
Facility: HOSPITAL | Age: 59
Setting detail: OUTPATIENT SURGERY
Discharge: HOME | End: 2024-08-07
Attending: ORTHOPAEDIC SURGERY | Admitting: ORTHOPAEDIC SURGERY
Payer: COMMERCIAL

## 2024-08-07 VITALS
WEIGHT: 145.94 LBS | HEIGHT: 62 IN | DIASTOLIC BLOOD PRESSURE: 68 MMHG | HEART RATE: 72 BPM | SYSTOLIC BLOOD PRESSURE: 125 MMHG | BODY MASS INDEX: 26.86 KG/M2 | RESPIRATION RATE: 16 BRPM | TEMPERATURE: 97.9 F | OXYGEN SATURATION: 95 %

## 2024-08-07 DIAGNOSIS — M23.91 INTERNAL DERANGEMENT OF RIGHT KNEE: ICD-10-CM

## 2024-08-07 DIAGNOSIS — S83.282A OTHER TEAR OF LATERAL MENISCUS, CURRENT INJURY, LEFT KNEE, INITIAL ENCOUNTER: ICD-10-CM

## 2024-08-07 DIAGNOSIS — S83.242A OTHER TEAR OF MEDIAL MENISCUS, CURRENT INJURY, LEFT KNEE, INITIAL ENCOUNTER: Primary | ICD-10-CM

## 2024-08-07 DIAGNOSIS — M54.16 LUMBAR RADICULOPATHY: ICD-10-CM

## 2024-08-07 DIAGNOSIS — S83.242A ACUTE MEDIAL MENISCUS TEAR OF LEFT KNEE, INITIAL ENCOUNTER: ICD-10-CM

## 2024-08-07 PROCEDURE — 3600000009 HC OR TIME - EACH INCREMENTAL 1 MINUTE - PROCEDURE LEVEL FOUR: Performed by: ORTHOPAEDIC SURGERY

## 2024-08-07 PROCEDURE — 7100000009 HC PHASE TWO TIME - INITIAL BASE CHARGE: Performed by: ORTHOPAEDIC SURGERY

## 2024-08-07 PROCEDURE — 3700000002 HC GENERAL ANESTHESIA TIME - EACH INCREMENTAL 1 MINUTE: Performed by: ORTHOPAEDIC SURGERY

## 2024-08-07 PROCEDURE — 2500000005 HC RX 250 GENERAL PHARMACY W/O HCPCS: Performed by: ORTHOPAEDIC SURGERY

## 2024-08-07 PROCEDURE — 7100000010 HC PHASE TWO TIME - EACH INCREMENTAL 1 MINUTE: Performed by: ORTHOPAEDIC SURGERY

## 2024-08-07 PROCEDURE — 7100000001 HC RECOVERY ROOM TIME - INITIAL BASE CHARGE: Performed by: ORTHOPAEDIC SURGERY

## 2024-08-07 PROCEDURE — 2500000004 HC RX 250 GENERAL PHARMACY W/ HCPCS (ALT 636 FOR OP/ED): Performed by: PHYSICIAN ASSISTANT

## 2024-08-07 PROCEDURE — 2720000007 HC OR 272 NO HCPCS: Performed by: ORTHOPAEDIC SURGERY

## 2024-08-07 PROCEDURE — 2500000005 HC RX 250 GENERAL PHARMACY W/O HCPCS: Performed by: ANESTHESIOLOGY

## 2024-08-07 PROCEDURE — 2500000004 HC RX 250 GENERAL PHARMACY W/ HCPCS (ALT 636 FOR OP/ED): Performed by: ANESTHESIOLOGY

## 2024-08-07 PROCEDURE — 29880 ARTHRS KNE SRG MNISECTMY M&L: CPT | Performed by: ORTHOPAEDIC SURGERY

## 2024-08-07 PROCEDURE — 3600000004 HC OR TIME - INITIAL BASE CHARGE - PROCEDURE LEVEL FOUR: Performed by: ORTHOPAEDIC SURGERY

## 2024-08-07 PROCEDURE — 2500000004 HC RX 250 GENERAL PHARMACY W/ HCPCS (ALT 636 FOR OP/ED): Mod: JZ | Performed by: PHYSICIAN ASSISTANT

## 2024-08-07 PROCEDURE — 3700000001 HC GENERAL ANESTHESIA TIME - INITIAL BASE CHARGE: Performed by: ORTHOPAEDIC SURGERY

## 2024-08-07 PROCEDURE — 7100000002 HC RECOVERY ROOM TIME - EACH INCREMENTAL 1 MINUTE: Performed by: ORTHOPAEDIC SURGERY

## 2024-08-07 RX ORDER — GLYCOPYRROLATE 0.2 MG/ML
INJECTION INTRAMUSCULAR; INTRAVENOUS AS NEEDED
Status: DISCONTINUED | OUTPATIENT
Start: 2024-08-07 | End: 2024-08-07

## 2024-08-07 RX ORDER — FENTANYL CITRATE 50 UG/ML
50 INJECTION, SOLUTION INTRAMUSCULAR; INTRAVENOUS EVERY 5 MIN PRN
Status: DISCONTINUED | OUTPATIENT
Start: 2024-08-07 | End: 2024-08-07 | Stop reason: HOSPADM

## 2024-08-07 RX ORDER — KETOROLAC TROMETHAMINE 30 MG/ML
INJECTION, SOLUTION INTRAMUSCULAR; INTRAVENOUS AS NEEDED
Status: DISCONTINUED | OUTPATIENT
Start: 2024-08-07 | End: 2024-08-07

## 2024-08-07 RX ORDER — MIDAZOLAM HYDROCHLORIDE 1 MG/ML
INJECTION, SOLUTION INTRAMUSCULAR; INTRAVENOUS AS NEEDED
Status: DISCONTINUED | OUTPATIENT
Start: 2024-08-07 | End: 2024-08-07

## 2024-08-07 RX ORDER — LIDOCAINE HYDROCHLORIDE 10 MG/ML
0.1 INJECTION, SOLUTION EPIDURAL; INFILTRATION; INTRACAUDAL; PERINEURAL ONCE
Status: DISCONTINUED | OUTPATIENT
Start: 2024-08-07 | End: 2024-08-07 | Stop reason: HOSPADM

## 2024-08-07 RX ORDER — LABETALOL HYDROCHLORIDE 5 MG/ML
5 INJECTION, SOLUTION INTRAVENOUS ONCE AS NEEDED
Status: DISCONTINUED | OUTPATIENT
Start: 2024-08-07 | End: 2024-08-07 | Stop reason: HOSPADM

## 2024-08-07 RX ORDER — CEFAZOLIN SODIUM 2 G/100ML
2 INJECTION, SOLUTION INTRAVENOUS ONCE
Status: COMPLETED | OUTPATIENT
Start: 2024-08-07 | End: 2024-08-07

## 2024-08-07 RX ORDER — ROCURONIUM BROMIDE 10 MG/ML
INJECTION, SOLUTION INTRAVENOUS AS NEEDED
Status: DISCONTINUED | OUTPATIENT
Start: 2024-08-07 | End: 2024-08-07

## 2024-08-07 RX ORDER — DIPHENHYDRAMINE HYDROCHLORIDE 50 MG/ML
12.5 INJECTION INTRAMUSCULAR; INTRAVENOUS ONCE AS NEEDED
Status: DISCONTINUED | OUTPATIENT
Start: 2024-08-07 | End: 2024-08-07 | Stop reason: HOSPADM

## 2024-08-07 RX ORDER — PROPOFOL 10 MG/ML
INJECTION, EMULSION INTRAVENOUS AS NEEDED
Status: DISCONTINUED | OUTPATIENT
Start: 2024-08-07 | End: 2024-08-07

## 2024-08-07 RX ORDER — SODIUM CHLORIDE 0.9 G/100ML
IRRIGANT IRRIGATION AS NEEDED
Status: DISCONTINUED | OUTPATIENT
Start: 2024-08-07 | End: 2024-08-07 | Stop reason: HOSPADM

## 2024-08-07 RX ORDER — DROPERIDOL 2.5 MG/ML
0.62 INJECTION, SOLUTION INTRAMUSCULAR; INTRAVENOUS ONCE AS NEEDED
Status: DISCONTINUED | OUTPATIENT
Start: 2024-08-07 | End: 2024-08-07 | Stop reason: HOSPADM

## 2024-08-07 RX ORDER — ONDANSETRON HYDROCHLORIDE 2 MG/ML
INJECTION, SOLUTION INTRAVENOUS AS NEEDED
Status: DISCONTINUED | OUTPATIENT
Start: 2024-08-07 | End: 2024-08-07

## 2024-08-07 RX ORDER — FENTANYL CITRATE 50 UG/ML
INJECTION, SOLUTION INTRAMUSCULAR; INTRAVENOUS AS NEEDED
Status: DISCONTINUED | OUTPATIENT
Start: 2024-08-07 | End: 2024-08-07

## 2024-08-07 RX ORDER — SCOLOPAMINE TRANSDERMAL SYSTEM 1 MG/1
PATCH, EXTENDED RELEASE TRANSDERMAL AS NEEDED
Status: DISCONTINUED | OUTPATIENT
Start: 2024-08-07 | End: 2024-08-07

## 2024-08-07 RX ORDER — HYDROMORPHONE HYDROCHLORIDE 1 MG/ML
INJECTION, SOLUTION INTRAMUSCULAR; INTRAVENOUS; SUBCUTANEOUS AS NEEDED
Status: DISCONTINUED | OUTPATIENT
Start: 2024-08-07 | End: 2024-08-07

## 2024-08-07 RX ORDER — SODIUM CHLORIDE 9 MG/ML
100 INJECTION, SOLUTION INTRAVENOUS CONTINUOUS
Status: DISCONTINUED | OUTPATIENT
Start: 2024-08-07 | End: 2024-08-07 | Stop reason: HOSPADM

## 2024-08-07 RX ORDER — SUCCINYLCHOLINE CHLORIDE 100 MG/5ML
SYRINGE (ML) INTRAVENOUS AS NEEDED
Status: DISCONTINUED | OUTPATIENT
Start: 2024-08-07 | End: 2024-08-07

## 2024-08-07 RX ORDER — BUPIVACAINE HCL/EPINEPHRINE 0.25-.0005
VIAL (ML) INJECTION AS NEEDED
Status: DISCONTINUED | OUTPATIENT
Start: 2024-08-07 | End: 2024-08-07 | Stop reason: HOSPADM

## 2024-08-07 RX ORDER — MEPERIDINE HYDROCHLORIDE 25 MG/ML
12.5 INJECTION INTRAMUSCULAR; INTRAVENOUS; SUBCUTANEOUS EVERY 10 MIN PRN
Status: DISCONTINUED | OUTPATIENT
Start: 2024-08-07 | End: 2024-08-07 | Stop reason: HOSPADM

## 2024-08-07 RX ORDER — LIDOCAINE HYDROCHLORIDE 20 MG/ML
INJECTION, SOLUTION INFILTRATION; PERINEURAL AS NEEDED
Status: DISCONTINUED | OUTPATIENT
Start: 2024-08-07 | End: 2024-08-07

## 2024-08-07 ASSESSMENT — PAIN - FUNCTIONAL ASSESSMENT
PAIN_FUNCTIONAL_ASSESSMENT: 0-10

## 2024-08-07 ASSESSMENT — PAIN SCALES - GENERAL
PAINLEVEL_OUTOF10: 0 - NO PAIN
PAINLEVEL_OUTOF10: 0 - NO PAIN
PAINLEVEL_OUTOF10: 3
PAINLEVEL_OUTOF10: 0 - NO PAIN
PAINLEVEL_OUTOF10: 5 - MODERATE PAIN

## 2024-08-07 ASSESSMENT — PAIN DESCRIPTION - DESCRIPTORS
DESCRIPTORS: ACHING
DESCRIPTORS: SHOOTING;STABBING

## 2024-08-07 ASSESSMENT — PAIN SCALES - WONG BAKER: WONGBAKER_NUMERICALRESPONSE: NO HURT

## 2024-08-07 ASSESSMENT — COLUMBIA-SUICIDE SEVERITY RATING SCALE - C-SSRS
6. HAVE YOU EVER DONE ANYTHING, STARTED TO DO ANYTHING, OR PREPARED TO DO ANYTHING TO END YOUR LIFE?: NO
1. IN THE PAST MONTH, HAVE YOU WISHED YOU WERE DEAD OR WISHED YOU COULD GO TO SLEEP AND NOT WAKE UP?: NO
2. HAVE YOU ACTUALLY HAD ANY THOUGHTS OF KILLING YOURSELF?: NO

## 2024-08-07 NOTE — ANESTHESIA POSTPROCEDURE EVALUATION
Patient: Gwen Nice    Procedure Summary       Date: 08/07/24 Room / Location: [facility]    Anesthesia Start: 0730 Anesthesia Stop: 0830    Procedure: ARTHROSCOPY KNEE, MEDIAL AND LATERAL  MENISCECTOMY (Left: Knee) Diagnosis:       Other tear of medial meniscus, current injury, left knee, initial encounter      Other tear of lateral meniscus, current injury, left knee, initial encounter      (Other tear of medial meniscus, current injury, left knee, initial encounter [S83.242A])      (Other tear of lateral meniscus, current injury, left knee, initial encounter [S83.282A])    Surgeons: Elkin Welsh MD Responsible Provider: Noé Morgan DO    Anesthesia Type: general ASA Status: 2            Anesthesia Type: general    Vitals Value Taken Time   /73 08/07/24 0827   Temp 36.7 08/07/24 0830   Pulse 88 08/07/24 0829   Resp 14 08/07/24 0829   SpO2 97% 08/07/24 0829   Vitals shown include unfiled device data.    Anesthesia Post Evaluation    Patient location during evaluation: PACU  Patient participation: complete - patient participated  Level of consciousness: sleepy but conscious  Pain management: adequate  Airway patency: patent  Cardiovascular status: acceptable, blood pressure returned to baseline and hemodynamically stable  Respiratory status: acceptable, nonlabored ventilation, spontaneous ventilation, face mask and unassisted  Hydration status: acceptable  Postoperative Nausea and Vomiting: none      There were no known notable events for this encounter.

## 2024-08-07 NOTE — OP NOTE
ARTHROSCOPY KNEE, MEDIAL AND LATERAL  MENISCECTOMY (L) Operative Note     Date: 2024  OR Location: [facility]    Name: Gwen Nice, : 1965, Age: 58 y.o., MRN: [id number], Sex: female    Diagnosis  Pre-op Diagnosis      * Other tear of medial meniscus, current injury, left knee, initial encounter [S83.242A]     * Other tear of lateral meniscus, current injury, left knee, initial encounter [S83.282A] Post-op Diagnosis     * Other tear of medial meniscus, current injury, left knee, initial encounter [S83.242A]     * Other tear of lateral meniscus, current injury, left knee, initial encounter [S83.282A]     Procedures  ARTHROSCOPY KNEE, MEDIAL AND LATERAL  MENISCECTOMY  33084 - [place] ARTHRS KNEE W/MENISCECTOMY MED&LAT W/SHAVING  Left knee arthroscopy with partial medial and lateral meniscectomy 20% / 10%    Surgeons      * Elkin Welsh - Primary    Resident/Fellow/Other Assistant:  Surgeons and Role:  * No surgeons found with a matching role *Yulia Stover PA-C    Procedure Summary  Anesthesia: General  ASA: II  Anesthesia Staff: Anesthesiologist: Noé Morgan DO  CRNA: RACHEL Garcia-CRNA  Estimated Blood Loss: Minimal mL  Intra-op Medications:   Administrations occurring from 0730 to 0830 on 24:   Medication Name Total Dose   sodium chloride 0.9 % irrigation solution 6,000 mL   BUPivacaine-EPINEPHrine (Marcaine w/EPI) 0.25 %-1:200,000 injection 15 mL   sodium chloride 0.9% infusion Cannot be calculated   ceFAZolin (Ancef) 2 g in dextrose (iso)  mL 2 g              Anesthesia Record               Intraprocedure I/O Totals          Intake    sodium chloride 0.9% infusion 900.00 mL    Total Intake 900 mL          Specimen: No specimens collected     Staff:   Circulator: Megan Kay Person: Deyanira         Drains and/or Catheters: * None in log *    Tourniquet Times:       Indications: 58-year-old female injured her LLE resulting in a complex meniscus tear with tubular surgery for  left knee arthroscopy meniscectomy    Risks benefits and alternatives to surgery were discussed including but not limited to Infection, bleeding, neurovascular injury, pain and dysfunction, hardware related complications including cutout failure breakage, loss of function, motion, and permanent disability as well as the cardiovascular and pulmonary complications from anesthesia including death and DVT. Patient and family accept these risks.  We discussed specifically post meniscectomy pain, incomplete pain relief, meniscus retear, progressive arthritis, intraoperative decisions in regards to other pathology, and the potential for future revision surgeries including arthroplasty    Patient identified in the preop area.  Left lower extremity marked and confirmed with patient as the operative site.  Brought back to the operating room and anesthetized under general anesthesia.  Operative lower extremity was then prepped and draped in standard sterile fashion.  Patient, site and procedure were confirmed with timeout.  Everyone in the room agreed.  Preop antibiotics were given.    We then made standard medial and lateral arthroscopy portal and the scope was introduced.  Medial joint space : Patient had a complex tear posterior horn medial meniscus near the root.  Did not resection of the frayed edges of the inner body about 20% from mid body back to the root or is detached.  Also read along the capsule.  No obvious loose body was identified.  Patient had moderate cartilage change in the medial compartment grade 3 diffusely over a 2 cm area.  ACL and notch were normal.  Patient with large marginal osteophyte anterior tibia resected over 8 mm area with high-speed bur  Lateral joint space: Patient is Molner is a radial tear this is the mid body appeared resected the inner edge and the undersurface of flap less than 10% total volume.  Cartilage was excellent  Patellofemoral joint space: Patient had moderate medial  patellofemoral arthritis and some central fissuring within the trochlea.  With circumferential laterally early entire suprapatellar pouch that fairly large plica that is improved her patellofemoral working space and overall mechanics    Wounds were irrigated with normal saline.  Portals were closed with standard fashion.  Sterile dressings were applied.  Patient was then awoken from general anesthesia and sent to the PACU in stable condition.    Yulia Stover PA-C was present throughout the entire case. Given the nature of the disease process and the procedure, a skilled surgical first assistant was necessary during the case. The assistant was necessary to hold retractors and to manipulate the extremity during the procedure. A certified scrub tech was at the back table managing the instruments and supplies for the surgical case.  Elkin Welsh  Phone Number: [phone]

## 2024-08-07 NOTE — TELEPHONE ENCOUNTER
Patient LVM stating she had surgery today by DM. It was a Lt. MMT/LMT . She states she is having some significant swelling in her top lip and she would like a call back to advise on what she should do about it or if she should be concerned with it,

## 2024-08-07 NOTE — H&P
History of Present Illness:  DOS: 02/14/24 Right knee arthroscopy, medial meniscectomy. She had an injection of her left knee on 04/19/24. She had an MRI on 05/06/24. She states that she is unable to go down the stairs. 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 She takes ibuprofen for pain management.      She states on Easter she was walking across some grass when her left knee locked up on her.  Left knee is a moderately uncomfortable.  She had a hard time walking on a Tuesday.  Deftly bothers to swelling.     Imaging:   MRI: Shows complex tear of medial meniscus and small lateral meniscus.      Exam:  No calf swelling  Negative Shira´s test  Distal neurovascular exam intact     Left knee: 5/5 quad strength.   Flexion to 110   Pain along the medial joint line.    1+ effusion  Positive Radha positive Apley grind.    Normal stability     Assessment:  Left knee pain     Plan:  Discussed short and long term implications for the knee.  Discussed analgesics, ice, rest.  Encouraged home exercise program, physical therapy.   Ice 60 minutes a day   Ibuprofen 800 mg sent to her pharmacy  Knee brace  Discussed left knee arthroscopy with medial meniscectomy and lateral meniscectomy. As of right now she would like to defer surgery until the fall for financial reasons.  We have discussed doing this at her convenience as well as long-term complications.     Left knee arthroscopy with partial medial and lateral menisectomy plan of care:  Risks benefits and alternatives to surgery were discussed including but not limited to Infection, bleeding, neurovascular injury, pain and dysfunction, hardware related complications including cutout failure breakage, loss of function, motion, and permanent disability as well as the cardiovascular and pulmonary complications from anesthesia including death and DVT. Patient and family accept these risks.  We discussed specifically post meniscectomy pain, incomplete pain  relief, meniscus retear, progressive arthritis, intraoperative decisions in regards to other pathology, and the potential for future revision surgeries including arthroplasty     Plan for outpatient surgery   1. 1 week postop follow up   2. Percocet for postop pain relief. OARRS has been reviewed and is consistent with prescribed medications. This report is scanned into the electronic medical record. The risks of abuse, dependence, addiction and diversion were considered. The medication is felt to be clinically appropriate based on documented diagnosis .  3.  Pre-CERT for removal postoperative knee brace        Medical History        Past Medical History:   Diagnosis Date    Anxiety      Depression      GERD (gastroesophageal reflux disease)      Hyperlipidemia      Hypertension      Joint pain      PONV (postoperative nausea and vomiting)      Postmenopausal      Sinusitis      Stress incontinence              Medication Documentation Review Audit         Reviewed by Cole C Budinsky, MD (Physician) on 04/14/24 at 1328       Medication Order Taking? Sig Documenting Provider Last Dose Status   Discontinued 04/12/24 1418   amLODIPine (Norvasc) 10 mg tablet [id number]   Take 1 tablet (10 mg) by mouth once daily. Felton Urbano MD   Active   Discontinued 04/12/24 1418   atorvastatin (Lipitor) 10 mg tablet [id number]   Take 1 tablet (10 mg) by mouth once daily. Felton Urbano MD   Active   cholecalciferol, vitamin D3, 250 mcg (10,000 unit) capsule [id number]   Please take 1 capsule by mouth with LUNCH every day.  Always with food please.  Thank you. Felton Urbano MD   Active   Discontinued 04/12/24 1418   citalopram (CeleXA) 10 mg tablet [id number]   Take 1 tablet (10 mg) by mouth once daily. Felton Urbano MD   Active   Discontinued 04/12/24 1418   furosemide (Lasix) 20 mg tablet [id number]   Take 1 tablet (20 mg) by mouth every 8 hours if needed (Bilateral edema.). Felton Urbano MD    Active    mg tablet [id number] No TAKE ONE TABLET BY MOUTH THREE TIMES A DAY WITH FOOD as needed Elkin Cotter PA-C Taking Active   methylPREDNISolone (Medrol Dospak) 4 mg tablets [id number]   Follow schedule on package instructions Cole C Budinsky, MD   Active   naproxen (Naprosyn) 500 mg tablet [id number]   Take 1 tablet (500 mg) by mouth 2 times a day with meals for 14 days. Cole C Budinsky, MD   Active   Discontinued 04/12/24 1418   omeprazole (PriLOSEC) 40 mg DR capsule [id number]   Please take 1 tablet by mouth before breakfast every day.  Thank you. Felton Urbano MD   Active   oxybutynin XL (Ditropan-XL) 10 mg 24 hr tablet [id number] No TAKE ONE TABLET BY MOUTH EVERY DAY   Patient not taking: Reported on 4/12/2024    Radames Lerma MD Not Taking Active                          RX Allergies         Allergies   Allergen Reactions    Aripiprazole Anaphylaxis    Bupropion Anaphylaxis and Swelling    Vancomycin Nausea And Vomiting       As of 5/25/20    Bee Venom Protein (Honey Bee) Swelling    Piperacillin-Tazobactam Swelling    Sulfa (Sulfonamide Antibiotics) Hives            Social History               Socioeconomic History    Marital status:        Spouse name: Not on file    Number of children: Not on file    Years of education: Not on file    Highest education level: Not on file   Occupational History    Not on file   Tobacco Use    Smoking status: Never    Smokeless tobacco: Never   Vaping Use    Vaping status: Never Used   Substance and Sexual Activity    Alcohol use: Yes       Alcohol/week: 21.0 standard drinks of alcohol       Types: 21 Shots of liquor per week       Comment: daily 2-3 drinks liquor    Drug use: Yes       Types: Marijuana       Comment: LAST USED MARIJUANA 2 DAYS, SMOKED    Sexual activity: Defer   Other Topics Concern    Not on file   Social History Narrative    Not on file      Social Determinants of Health      Financial Resource Strain: Not on file    Food Insecurity: Not on file   Transportation Needs: Not on file   Physical Activity: Not on file   Stress: Not on file   Social Connections: Not on file   Intimate Partner Violence: Not on file   Housing Stability: Not on file            Surgical History         Past Surgical History:   Procedure Laterality Date    CATARACT EXTRACTION Bilateral      NASAL SEPTUM SURGERY        OTHER SURGICAL HISTORY   12/04/2020     Breast augmentation    OTHER SURGICAL HISTORY   12/04/2020     Rhinoplasty    OTHER SURGICAL HISTORY Right       Ulnar collateral ligament of thumb rupture repair    TUBAL LIGATION                Scribe Attestation  By signing my name below, INicolle , Scribe   attest that this documentation has been prepared under the direction and in the presence of Elkin Welsh MD.          Electronically signed by Elkin Welsh MD at 5/13/2024  6:10 AM  Electronically signed by Elkin Welsh MD at 7/18/2024 10:10 AM  Electronically signed by Elkin Welsh MD at 7/19/2024 12:15 PM   Chart Correction History       Instructions    [facility] - Outpatient (Automatic SnapShot taken 5/13/2024)  Additional Documentation    Flowsheets: Interfaced Flowsheet Data   Encounter Info: Billing Info,     History,     Allergies,     Developmental     Communications    View All Conversations on this Encounter

## 2024-08-07 NOTE — ANESTHESIA PROCEDURE NOTES
Airway  Date/Time: 8/7/2024 7:40 AM  Urgency: elective    Airway not difficult    Staffing  Performed: CRNA   Authorized by: Noé Morgan DO    Performed by: RACHEL Garcia-NILSA  Patient location during procedure: OR    Indications and Patient Condition  Indications for airway management: anesthesia and airway protection  Spontaneous Ventilation: absent  Sedation level: deep  Preoxygenated: yes  Mask difficulty assessment: 0 - not attempted (RSI)    Final Airway Details  Final airway type: endotracheal airway      Successful airway: ETT  Cuffed: yes   Successful intubation technique: video laryngoscopy  Facilitating devices/methods: intubating stylet and cricoid pressure  Endotracheal tube insertion site: oral  Blade size: #3  ETT size (mm): 7.0  Cormack-Lehane Classification: grade I - full view of glottis  Placement verified by: chest auscultation and capnometry   Measured from: gums  ETT to gums (cm): 22  Number of attempts at approach: 1    Additional Comments  RSI for severe GERD and daily emesis

## 2024-08-12 ENCOUNTER — APPOINTMENT (OUTPATIENT)
Dept: RADIOLOGY | Facility: HOSPITAL | Age: 59
End: 2024-08-12
Payer: COMMERCIAL

## 2024-08-16 ENCOUNTER — OFFICE VISIT (OUTPATIENT)
Dept: ORTHOPEDIC SURGERY | Facility: CLINIC | Age: 59
End: 2024-08-16
Payer: COMMERCIAL

## 2024-08-16 ENCOUNTER — APPOINTMENT (OUTPATIENT)
Dept: ORTHOPEDIC SURGERY | Facility: CLINIC | Age: 59
End: 2024-08-16
Payer: COMMERCIAL

## 2024-08-16 DIAGNOSIS — S83.231D COMPLEX TEAR OF MEDIAL MENISCUS OF RIGHT KNEE AS CURRENT INJURY, SUBSEQUENT ENCOUNTER: Primary | ICD-10-CM

## 2024-08-16 DIAGNOSIS — M54.50 LOW BACK PAIN, UNSPECIFIED BACK PAIN LATERALITY, UNSPECIFIED CHRONICITY, UNSPECIFIED WHETHER SCIATICA PRESENT: ICD-10-CM

## 2024-08-16 DIAGNOSIS — S83.242A ACUTE MEDIAL MENISCUS TEAR OF LEFT KNEE, INITIAL ENCOUNTER: ICD-10-CM

## 2024-08-16 PROCEDURE — 99211 OFF/OP EST MAY X REQ PHY/QHP: CPT | Performed by: PHYSICIAN ASSISTANT

## 2024-08-16 RX ORDER — METHYLPREDNISOLONE 4 MG/1
TABLET ORAL
Qty: 1 EACH | Refills: 0 | Status: SHIPPED | OUTPATIENT
Start: 2024-08-16

## 2024-08-16 RX ORDER — CYCLOBENZAPRINE HCL 10 MG
10 TABLET ORAL NIGHTLY PRN
Qty: 14 TABLET | Refills: 0 | Status: SHIPPED | OUTPATIENT
Start: 2024-08-16 | End: 2024-08-30

## 2024-08-16 NOTE — PROGRESS NOTES
History of Present Illness:  Date of Surgery: 8/7/2024.  Right knee scope with partial medial and lateral meniscectomy (20% / 10%).  Overall patient states the knee is doing well, pain is improving, no complaints regarding the knee.  Patient does states she has been experiencing a lot of low back pain recently.    Exam:  Mild effusion and ecchymosis  Right knee incisions clean dry intact healing well without drainage  Right knee range of motion from 0 to 100 degrees of flexion  No calf swelling   Negative Shira's test  Distal neurovascular exam intact    Assessment:  Patient status post right knee scope with partial medial and lateral meniscectomy    Plan:  Reviewed arthroscopic photos and findings  Send in a Medrol Dosepak and Flexeril for her back pain  The patient plans to follow-up in the next couple of weeks to talk with Dr. Welsh regarding her low back pain

## 2024-08-18 NOTE — PROGRESS NOTES
History of Present Illness:  Date of Surgery: 8/7/2024.  Right knee scope with partial medial and lateral meniscectomy (20% / 10%).  Overall patient states the knee is doing well, pain is improving, no complaints regarding the knee.  Ibuprofen has not improved her pain. She has been taking Flexeril at night.    Patient does states she has been experiencing a lot of low back pain since 07/09/24. This is a work related injury. She has seen Dr. Kimble and has had a MRI of her lumbar spine. She recently saw Archie Crook for therapy. Oral steroids and flexeril have helped.     Imaging:  MRI: Shows a disc herniation at L4-5 on the left with some mild degenerative change at multiple levels.     Exam:  LLE:  5/5 hip flexor, quad, hamstring, ankle flexion. No clonus  Distal neurovascular exam intact  Slight positive straight leg raise on the left at 60°  Full passive motion to the hip    Assessment:  Patient status post right knee scope with partial medial and lateral meniscectomy  Lumbar radiculopathy     Plan:  Reviewed arthroscopic photos and findings  Celebrex sent to pharmacy for her back complaints  Valium for muscle spasm   I recommended an epidural steroid injection with Dr. Robledo on the left side at L4-5.   She is going to follow-up with Dr. Kimble for her back moving forward and me for her knee.         Scribe Attestation  By signing my name below, Nicolle PIMENTEL Scribe   attest that this documentation has been prepared under the direction and in the presence of Elkin Welsh MD.

## 2024-08-23 ENCOUNTER — OFFICE VISIT (OUTPATIENT)
Dept: ORTHOPEDIC SURGERY | Facility: CLINIC | Age: 59
End: 2024-08-23
Payer: COMMERCIAL

## 2024-08-23 DIAGNOSIS — M54.16 LUMBAR RADICULOPATHY: ICD-10-CM

## 2024-08-23 DIAGNOSIS — S39.012A LUMBAR STRAIN, INITIAL ENCOUNTER: ICD-10-CM

## 2024-08-23 DIAGNOSIS — M54.50 LOW BACK PAIN, UNSPECIFIED BACK PAIN LATERALITY, UNSPECIFIED CHRONICITY, UNSPECIFIED WHETHER SCIATICA PRESENT: Primary | ICD-10-CM

## 2024-08-23 PROCEDURE — 99214 OFFICE O/P EST MOD 30 MIN: CPT | Performed by: ORTHOPAEDIC SURGERY

## 2024-08-23 RX ORDER — DIAZEPAM 5 MG/1
5 TABLET ORAL EVERY 8 HOURS PRN
Qty: 10 TABLET | Refills: 0 | Status: SHIPPED | OUTPATIENT
Start: 2024-08-23 | End: 2024-08-30

## 2024-08-23 RX ORDER — CELECOXIB 200 MG/1
200 CAPSULE ORAL DAILY
Qty: 30 CAPSULE | Refills: 0 | Status: SHIPPED | OUTPATIENT
Start: 2024-08-23 | End: 2024-09-22

## 2024-08-27 ENCOUNTER — APPOINTMENT (OUTPATIENT)
Dept: ORTHOPEDIC SURGERY | Facility: CLINIC | Age: 59
End: 2024-08-27
Payer: COMMERCIAL

## 2024-08-27 DIAGNOSIS — F41.9 ANXIETY: ICD-10-CM

## 2024-08-27 DIAGNOSIS — S83.231D COMPLEX TEAR OF MEDIAL MENISCUS OF RIGHT KNEE AS CURRENT INJURY, SUBSEQUENT ENCOUNTER: ICD-10-CM

## 2024-08-27 RX ORDER — CITALOPRAM 10 MG/1
10 TABLET ORAL DAILY
Qty: 30 TABLET | Refills: 0 | Status: SHIPPED | OUTPATIENT
Start: 2024-08-27

## 2024-09-12 ENCOUNTER — LAB (OUTPATIENT)
Dept: LAB | Facility: LAB | Age: 59
End: 2024-09-12
Payer: COMMERCIAL

## 2024-09-12 DIAGNOSIS — Z12.11 ENCOUNTER FOR SCREENING FOR MALIGNANT NEOPLASM OF COLON: ICD-10-CM

## 2024-09-12 DIAGNOSIS — R73.9 HYPERGLYCEMIA: ICD-10-CM

## 2024-09-12 DIAGNOSIS — R60.0 BILATERAL EDEMA OF LOWER EXTREMITY: ICD-10-CM

## 2024-09-12 DIAGNOSIS — I10 ESSENTIAL HYPERTENSION: ICD-10-CM

## 2024-09-12 DIAGNOSIS — K21.9 GASTROESOPHAGEAL REFLUX DISEASE, UNSPECIFIED WHETHER ESOPHAGITIS PRESENT: ICD-10-CM

## 2024-09-12 DIAGNOSIS — Z12.4 SCREENING FOR CERVICAL CANCER: ICD-10-CM

## 2024-09-12 DIAGNOSIS — R63.5 WEIGHT GAIN: ICD-10-CM

## 2024-09-12 DIAGNOSIS — E55.9 VITAMIN D DEFICIENCY: ICD-10-CM

## 2024-09-12 DIAGNOSIS — E66.09 CLASS 1 OBESITY DUE TO EXCESS CALORIES WITH SERIOUS COMORBIDITY AND BODY MASS INDEX (BMI) OF 30.0 TO 30.9 IN ADULT: ICD-10-CM

## 2024-09-12 DIAGNOSIS — F41.9 ANXIETY: ICD-10-CM

## 2024-09-12 DIAGNOSIS — Z91.89 FRAMINGHAM CARDIAC RISK <10% IN NEXT 10 YEARS: ICD-10-CM

## 2024-09-12 DIAGNOSIS — R53.83 FATIGUE, UNSPECIFIED TYPE: ICD-10-CM

## 2024-09-12 DIAGNOSIS — M25.562 ACUTE PAIN OF LEFT KNEE: ICD-10-CM

## 2024-09-12 DIAGNOSIS — E78.2 HYPERCHOLESTEROLEMIA WITH HYPERTRIGLYCERIDEMIA: ICD-10-CM

## 2024-09-12 DIAGNOSIS — N32.81 OVERACTIVE BLADDER: ICD-10-CM

## 2024-09-12 LAB
25(OH)D3 SERPL-MCNC: 62 NG/ML (ref 30–100)
ALBUMIN SERPL BCP-MCNC: 4.4 G/DL (ref 3.4–5)
ALP SERPL-CCNC: 55 U/L (ref 33–136)
ALT SERPL W P-5'-P-CCNC: 23 U/L (ref 7–45)
ANION GAP SERPL CALC-SCNC: 13 MMOL/L (ref 10–20)
APPEARANCE UR: ABNORMAL
AST SERPL W P-5'-P-CCNC: 14 U/L (ref 9–39)
BASOPHILS # BLD AUTO: 0.04 X10*3/UL (ref 0–0.1)
BASOPHILS NFR BLD AUTO: 0.9 %
BILIRUB SERPL-MCNC: 0.7 MG/DL (ref 0–1.2)
BILIRUB UR STRIP.AUTO-MCNC: NEGATIVE MG/DL
BUN SERPL-MCNC: 21 MG/DL (ref 6–23)
CALCIUM SERPL-MCNC: 9 MG/DL (ref 8.6–10.3)
CHLORIDE SERPL-SCNC: 106 MMOL/L (ref 98–107)
CHOLEST SERPL-MCNC: 220 MG/DL (ref 0–199)
CHOLESTEROL/HDL RATIO: 2.1
CO2 SERPL-SCNC: 25 MMOL/L (ref 21–32)
COLOR UR: ABNORMAL
CREAT SERPL-MCNC: 0.76 MG/DL (ref 0.5–1.05)
EGFRCR SERPLBLD CKD-EPI 2021: 90 ML/MIN/1.73M*2
EOSINOPHIL # BLD AUTO: 0.08 X10*3/UL (ref 0–0.7)
EOSINOPHIL NFR BLD AUTO: 1.8 %
ERYTHROCYTE [DISTWIDTH] IN BLOOD BY AUTOMATED COUNT: 12.3 % (ref 11.5–14.5)
EST. AVERAGE GLUCOSE BLD GHB EST-MCNC: 105 MG/DL
FOLATE SERPL-MCNC: 7.6 NG/ML
GLUCOSE SERPL-MCNC: 130 MG/DL (ref 74–99)
GLUCOSE UR STRIP.AUTO-MCNC: NORMAL MG/DL
HBA1C MFR BLD: 5.3 %
HCT VFR BLD AUTO: 37.6 % (ref 36–46)
HDLC SERPL-MCNC: 107.1 MG/DL
HGB BLD-MCNC: 13 G/DL (ref 12–16)
HOLD SPECIMEN: NORMAL
IMM GRANULOCYTES # BLD AUTO: 0.02 X10*3/UL (ref 0–0.7)
IMM GRANULOCYTES NFR BLD AUTO: 0.4 % (ref 0–0.9)
KETONES UR STRIP.AUTO-MCNC: NEGATIVE MG/DL
LDLC SERPL CALC-MCNC: 100 MG/DL
LEUKOCYTE ESTERASE UR QL STRIP.AUTO: NEGATIVE
LYMPHOCYTES # BLD AUTO: 1.44 X10*3/UL (ref 1.2–4.8)
LYMPHOCYTES NFR BLD AUTO: 32.2 %
MAGNESIUM SERPL-MCNC: 1.69 MG/DL (ref 1.6–2.4)
MCH RBC QN AUTO: 31.3 PG (ref 26–34)
MCHC RBC AUTO-ENTMCNC: 34.6 G/DL (ref 32–36)
MCV RBC AUTO: 90 FL (ref 80–100)
MONOCYTES # BLD AUTO: 0.31 X10*3/UL (ref 0.1–1)
MONOCYTES NFR BLD AUTO: 6.9 %
NEUTROPHILS # BLD AUTO: 2.58 X10*3/UL (ref 1.2–7.7)
NEUTROPHILS NFR BLD AUTO: 57.8 %
NITRITE UR QL STRIP.AUTO: NEGATIVE
NON HDL CHOLESTEROL: 113 MG/DL (ref 0–149)
NRBC BLD-RTO: 0 /100 WBCS (ref 0–0)
PH UR STRIP.AUTO: 7 [PH]
PLATELET # BLD AUTO: 327 X10*3/UL (ref 150–450)
POTASSIUM SERPL-SCNC: 3.6 MMOL/L (ref 3.5–5.3)
PROT SERPL-MCNC: 6.5 G/DL (ref 6.4–8.2)
PROT UR STRIP.AUTO-MCNC: NEGATIVE MG/DL
RBC # BLD AUTO: 4.16 X10*6/UL (ref 4–5.2)
RBC # UR STRIP.AUTO: NEGATIVE /UL
SODIUM SERPL-SCNC: 140 MMOL/L (ref 136–145)
SP GR UR STRIP.AUTO: 1.01
TRIGL SERPL-MCNC: 64 MG/DL (ref 0–149)
TSH SERPL-ACNC: 1.1 MIU/L (ref 0.44–3.98)
UROBILINOGEN UR STRIP.AUTO-MCNC: NORMAL MG/DL
VIT B12 SERPL-MCNC: 269 PG/ML (ref 211–911)
VLDL: 13 MG/DL (ref 0–40)
WBC # BLD AUTO: 4.5 X10*3/UL (ref 4.4–11.3)

## 2024-09-12 PROCEDURE — 80053 COMPREHEN METABOLIC PANEL: CPT

## 2024-09-12 PROCEDURE — 83036 HEMOGLOBIN GLYCOSYLATED A1C: CPT

## 2024-09-12 PROCEDURE — 82607 VITAMIN B-12: CPT

## 2024-09-12 PROCEDURE — 85025 COMPLETE CBC W/AUTO DIFF WBC: CPT

## 2024-09-12 PROCEDURE — 83735 ASSAY OF MAGNESIUM: CPT

## 2024-09-12 PROCEDURE — 80061 LIPID PANEL: CPT

## 2024-09-12 PROCEDURE — 82306 VITAMIN D 25 HYDROXY: CPT

## 2024-09-12 PROCEDURE — 82746 ASSAY OF FOLIC ACID SERUM: CPT

## 2024-09-12 PROCEDURE — 81003 URINALYSIS AUTO W/O SCOPE: CPT

## 2024-09-12 PROCEDURE — 84443 ASSAY THYROID STIM HORMONE: CPT

## 2024-09-17 ENCOUNTER — APPOINTMENT (OUTPATIENT)
Dept: PRIMARY CARE | Facility: CLINIC | Age: 59
End: 2024-09-17
Payer: COMMERCIAL

## 2024-09-17 ENCOUNTER — OFFICE VISIT (OUTPATIENT)
Dept: ORTHOPEDIC SURGERY | Facility: CLINIC | Age: 59
End: 2024-09-17
Payer: COMMERCIAL

## 2024-09-17 VITALS
WEIGHT: 150 LBS | OXYGEN SATURATION: 97 % | HEIGHT: 62 IN | SYSTOLIC BLOOD PRESSURE: 146 MMHG | HEART RATE: 81 BPM | DIASTOLIC BLOOD PRESSURE: 75 MMHG | BODY MASS INDEX: 27.6 KG/M2

## 2024-09-17 DIAGNOSIS — Z12.11 SCREENING FOR COLON CANCER: ICD-10-CM

## 2024-09-17 DIAGNOSIS — R73.9 HYPERGLYCEMIA: ICD-10-CM

## 2024-09-17 DIAGNOSIS — E55.9 VITAMIN D DEFICIENCY: ICD-10-CM

## 2024-09-17 DIAGNOSIS — I10 ESSENTIAL HYPERTENSION: Primary | ICD-10-CM

## 2024-09-17 DIAGNOSIS — Z12.4 SCREENING FOR CERVICAL CANCER: ICD-10-CM

## 2024-09-17 DIAGNOSIS — N32.81 OVERACTIVE BLADDER: ICD-10-CM

## 2024-09-17 DIAGNOSIS — M54.16 LUMBAR RADICULOPATHY: Primary | ICD-10-CM

## 2024-09-17 DIAGNOSIS — Z91.89 FRAMINGHAM CARDIAC RISK <10% IN NEXT 10 YEARS: ICD-10-CM

## 2024-09-17 DIAGNOSIS — E78.2 HYPERCHOLESTEROLEMIA WITH HYPERTRIGLYCERIDEMIA: ICD-10-CM

## 2024-09-17 DIAGNOSIS — R23.2 HOT FLASHES: ICD-10-CM

## 2024-09-17 PROCEDURE — 3078F DIAST BP <80 MM HG: CPT | Performed by: INTERNAL MEDICINE

## 2024-09-17 PROCEDURE — 1036F TOBACCO NON-USER: CPT | Performed by: INTERNAL MEDICINE

## 2024-09-17 PROCEDURE — 99215 OFFICE O/P EST HI 40 MIN: CPT | Performed by: ORTHOPAEDIC SURGERY

## 2024-09-17 PROCEDURE — 3077F SYST BP >= 140 MM HG: CPT | Performed by: INTERNAL MEDICINE

## 2024-09-17 PROCEDURE — 3008F BODY MASS INDEX DOCD: CPT | Performed by: INTERNAL MEDICINE

## 2024-09-17 PROCEDURE — 99213 OFFICE O/P EST LOW 20 MIN: CPT | Performed by: INTERNAL MEDICINE

## 2024-09-17 RX ORDER — LOSARTAN POTASSIUM 25 MG/1
TABLET ORAL
Qty: 100 TABLET | Refills: 0 | Status: SHIPPED | OUTPATIENT
Start: 2024-09-17

## 2024-09-17 NOTE — PROGRESS NOTES
Gwen Nice is a 59 y.o. female who presents for Follow-up of the Lower Back.    HPI:  59-year-old female here for follow-up of low back.  She denies any fever chills nausea vomiting night sweats.  She has no bowel or bladder complaints.    Physical exam:  Well-nourished, well kept.No lymphangitis or lymphadenopathy in the examined extremities.  Gait normal.  Can stand on heels and toes.   Examination of the back shows tenderness in the paraspinous musculature.  There is decreased range of motion in all directions due to guarding/muscle spasms and pain at extremes.  There is good strength and no instability.  Examination of the lower extremities reveals no point tenderness, swelling, or deformity.  Range of motion of the hips, knees, and ankles are full without crepitance, instability, or exacerbation of pain.  Strength is 5/5 throughout.  No redness, abrasions, or lesions on extremities  Gross sensation intact in the extremities.  Affect normal.  Alert and oriented ×3.  Coordination normal.    Assessment:  [facility] 59-year-old female here for follow-up of low back and left leg pain.  She did have her left knee scope with Dr. Welsh which went well, she is not having the knee pain that she was having.  She is still having significant low back pain and left leg radiating pain down into the hamstring lateral thigh and calf and shin area.  She is currently doing physical therapy 3 times a week and she thinks it may be helping with her mobility but her back pain still remains quite severe.  She is uncertain if she can go back to work at her level of pain and functionality.    For complete plan and/or surgical details, please refer to Dr. Kimble's portion of this split dictation.    -Chan Cote PA-C      In a face-to-face encounter, I performed a history and physical examination, discussed pertinent diagnostic studies if indicated, and discussed diagnosis and management strategies with both the patient and the  midlevel provider.  I reviewed the midlevel's note and agree with the documented findings and plan of care.    This is a patient who has severe left leg radiculopathy that goes from her buttock hamstring calf all the way to the foot.  This all stemmed from a Worker's Comp. injury.  She had no symptoms like this before her work injury.  She has an L3-4 and L4-5 stenosis and an L4-5 spondylolisthesis where most of her stenosis is at L4-5.  She had a knee scope done by Dr. Welsh which seems to be helping with her knee symptoms but she still has radicular symptoms.  She is in physical therapy.  I had a long discussion with the patient and explained to them that their options are 1) live with the symptoms and see how they evolve, 2) physical therapy, 3) pain management or 4) surgery.  She is severely inhibited with bodily function.  She is having difficulty working.  At this point she wants to continue with physical therapy and hold off on any surgery.  An operation on her would be an L4-5 laminectomy with a globus retractor coming of the left side.  We would do a TLIF at that level and then swing up to the L3-4 level and do a laminectomy on the left.  She will continue with physical therapy.  We will submit for the appropriate diagnosis codes and we will see her back in about 6 weeks and see how she is doing.    Kevin Kimble MD  Orthopedic surgery

## 2024-09-17 NOTE — PROGRESS NOTES
"Subjective   Patient ID: Gwen Nice is a 59 y.o. female who presents for Follow-up (Patient presented today for a 6 month follow up./).    HPI   Somewhat lost to follow-up, but remaining compliant with medications, tolerating regimens.  Concentrating on pain management, knee, now back.  Following closely with ORTHOPEDICS, as well as PHYSICAL THERAPY.  No headache, blurred vision, diplopia.  No dysphagia.    Has been asking for refills, and this triggered the invitation to return with the benefit of FASTING laboratory examinations.  No pam substernal chest pain, no orthopnea, no paroxysmal nocturnal dyspnea.  In the meantime, continues to want to maintain and hopefully improve quality of life.  Does not wish harm to self or others.     Some modest weight loss noted, especially with use of low-dose hormone regimens for symptomatic menopause.  ENDOCRINE with no polyuria, polydipsia, polyphagia.  No blurred vision.  No skin, hair, nail changes.  No dramatic weight loss or weight gain.  No dysuria, flank, suprapubic pain.  No discharge, no pruritus.  No rash.  No malodor.  No hesitancy, no feeling of incomplete voiding.  No skin changes, rashes, pruritus, jaundice.  No easy bruisability.          Review of Systems  Review of systems as in history of present illness, and otherwise, reviewed separately as well, and was unremarkable/negative/noncontributory.        Objective   /75 (BP Location: Left arm, Patient Position: Sitting, BP Cuff Size: Adult)   Pulse 81   Ht 1.575 m (5' 2\")   Wt 68 kg (150 lb)   SpO2 97%   BMI 27.44 kg/m²     Physical Exam  In good spirits.  Not in distress or diaphoresis.  Alert, oriented x 3.  Amiable.  Not unkempt.  Receptive, cheerful, appropriate, and eager to maintain and hopefully improve quality of life.  Moderately built.  Remaining independent and capable.  Appropriate.    HEAD pink palpebral conjunctivae, anicteric sclerae.  NECK supple, no apparent jugular venous " distention.  CARDIOVASCULAR not in distress or diaphoresis.  No bipedal edema.  LUNGS not in distress or diaphoresis.  Not using accessory muscles.  ABDOMEN soft, nontender.  BACK no costovertebral angle tenderness.  EXTREMITIES no clubbing, no cyanosis.  NEURO no facial asymmetry.  No apparent cranial nerve deficits.  Romberg negative.  Ambulating without need of assistance.  No apparent focal weakness.  No tremors.  PSYCH receptive, appropriate, and eager to maintain and improve quality of life.        LABORATORY results reviewed with patient.  Hemoglobin A1c 5.3, BMI 27.44.  Hypercholesterolemia, hyperlipidemia noted, with preserved liver and kidney function.  Vitamin D 62.  Urine negative.        Assessment/Plan   Diagnoses and all orders for this visit:  Essential hypertension  -     losartan (Cozaar) 25 mg tablet; Please take 1 tablet by mouth with SUPPER every evening.  Thank you.  -     Follow Up In Primary Care - Established; Future  Hypercholesterolemia with hypertriglyceridemia  -     Follow Up In Primary Care - Established; Future  Hyperglycemia  -     Follow Up In Primary Care - Established; Future  [facility] cardiac risk <10% in next 10 years  -     Follow Up In Primary Care - Established; Future  Overactive bladder  -     Follow Up In Primary Care - Established; Future  Screening for colon cancer  -     Follow Up In Primary Care - Established; Future  Screening for cervical cancer  -     Follow Up In Primary Care - Established; Future  Vitamin D deficiency  -     Follow Up In Primary Care - Established; Future  Hot flashes  -     Follow Up In Primary Care - Established; Future       Thank you very much for coming.  I am very happy to see you!    After resting, your blood pressure remained ELEVATED.  Please continue taking AMLODIPINE 10 mg by mouth every day.  In the future, we will try and see if we can wean your amlodipine down to 5 mg to minimize tendency for leg edema and constipation.  Until  then, please continue taking your amlodipine as is.    Your blood pressure will benefit from a medication called LOSARTAN 25 mg.  Take 1 tablet by mouth with SUPPER every evening.    Please drink lots of fluids throughout the day and avoid salt.  Very good for blood pressure control, and very good for keeping your kidneys healthy!    Please continue doing PHYSICAL THERAPY.  All of these are also good for blood pressure management, as well as improvement of mood and energy!  Also good for weight management.    After about 1 week, start checking your blood pressure in the evening, in a calm and resting state.  Seated position, feet flat on the floor.  Use the backrest of the chair.  Please call me if your heart rate at rest is 90 or higher.  Please call me if the first number of your blood pressure is 145 or higher after rest.  Please call me if the second number of your blood pressure is 85 or higher after rest.  We can make some adjustments over the telephone until I see you again.    Get yourself an automated blood pressure cuff.  You can get a good one from amazon.com.  [employer] is an excellent brand!  The simplest machine is about $40, well worth it!    Please come back in 6 weeks, so that I can reevaluate how you are doing.    In the meantime, you did your FASTING laboratory examinations, and your numbers are great!  Hemoglobin A1c 5.3.  Marker for diabetes, all normal.  Cholesterol panel very good.  You do not need any cholesterol medications at this time.   Your VITAMIN D is adequate.    Please come back in 6 weeks, so that we can finalize your blood pressure regimens.  Please continue following with GI, and let us see what they recommend regarding surveillance of your colon.  Please continue following with GYNECOLOGY, Dr. Lerma.    I do understand that you have opted not to do any vaccinations.  If you change your mind, the INFLUENZA vaccination is actually safe to take.  Best time to do it is late  September/early October.    Again, thank you very much for coming.  It is very nice to see you again.  Let me catch up with you in 6 weeks.  Until then, please do not hesitate to call with questions or concerns.  I will make sure that you have enough refills for 6 months.  Take care and God bless.            0  Return in 6 weeks.  20 minutes please.  Reassess hemodynamics, cardiovascular risk.  Consider decreasing amlodipine and increasing losartan.  Coordinate with orthopedics, physical therapy, gynecology, GI.  If appropriate, reconsider weight management.            0

## 2024-09-17 NOTE — PATIENT INSTRUCTIONS
Thank you very much for coming.  I am very happy to see you!    After resting, your blood pressure remained ELEVATED.  Please continue taking AMLODIPINE 10 mg by mouth every day.  In the future, we will try and see if we can wean your amlodipine down to 5 mg to minimize tendency for leg edema and constipation.  Until then, please continue taking your amlodipine as is.    Your blood pressure will benefit from a medication called LOSARTAN 25 mg.  Take 1 tablet by mouth with SUPPER every evening.    Please drink lots of fluids throughout the day and avoid salt.  Very good for blood pressure control, and very good for keeping your kidneys healthy!    Please continue doing PHYSICAL THERAPY.  All of these are also good for blood pressure management, as well as improvement of mood and energy!  Also good for weight management.    After about 1 week, start checking your blood pressure in the evening, in a calm and resting state.  Seated position, feet flat on the floor.  Use the backrest of the chair.  Please call me if your heart rate at rest is 90 or higher.  Please call me if the first number of your blood pressure is 145 or higher after rest.  Please call me if the second number of your blood pressure is 85 or higher after rest.  We can make some adjustments over the telephone until I see you again.    Get yourself an automated blood pressure cuff.  You can get a good one from amazon.com.  [employer]RON is an excellent brand!  The simplest machine is about $40, well worth it!    Please come back in 6 weeks, so that I can reevaluate how you are doing.    In the meantime, you did your FASTING laboratory examinations, and your numbers are great!  Hemoglobin A1c 5.3.  Marker for diabetes, all normal.  Cholesterol panel very good.  You do not need any cholesterol medications at this time.   Your VITAMIN D is adequate.    Please come back in 6 weeks, so that we can finalize your blood pressure regimens.  Please continue following with  GI, and let us see what they recommend regarding surveillance of your colon.  Please continue following with GYNECOLOGY, Dr. Lrema.    I do understand that you have opted not to do any vaccinations.  If you change your mind, the INFLUENZA vaccination is actually safe to take.  Best time to do it is late September/early October.    Again, thank you very much for coming.  It is very nice to see you again.  Let me catch up with you in 6 weeks.  Until then, please do not hesitate to call with questions or concerns.  I will make sure that you have enough refills for 6 months.  Take care and God bless.            0  Return in 6 weeks.  20 minutes please.  Reassess hemodynamics, cardiovascular risk.  Consider decreasing amlodipine and increasing losartan.  Coordinate with orthopedics, physical therapy, gynecology, GI.  If appropriate, reconsider weight management.            0

## 2024-09-17 NOTE — LETTER
September 17, 2024     Patient: Gwen Nice   YOB: 1965   Date of Visit: 9/17/2024       To Whom It May Concern:    It is my medical opinion that Gwen Nice  may return to work on 09/23/2024 with the following restrictions: No lifting/pushing/pulling over 5 pounds, no continuous standing more than 2 hours at a time, allow to take a 10 minute break every 2 hours as needed, no continuous bending/twisting . Restrictions are for through 11/04/2024.    If you have any questions or concerns, please don't hesitate to call.         Sincerely,        Kevin Kimble MD    CC: No Recipients

## 2024-09-27 ENCOUNTER — APPOINTMENT (OUTPATIENT)
Dept: PAIN MEDICINE | Facility: CLINIC | Age: 59
End: 2024-09-27
Payer: COMMERCIAL

## 2024-09-29 DIAGNOSIS — F41.9 ANXIETY: ICD-10-CM

## 2024-09-30 RX ORDER — CITALOPRAM 10 MG/1
10 TABLET ORAL DAILY
Qty: 30 TABLET | Refills: 0 | Status: SHIPPED | OUTPATIENT
Start: 2024-09-30

## 2024-10-07 ENCOUNTER — APPOINTMENT (OUTPATIENT)
Dept: GASTROENTEROLOGY | Facility: CLINIC | Age: 59
End: 2024-10-07
Payer: COMMERCIAL

## 2024-10-07 VITALS
BODY MASS INDEX: 28.34 KG/M2 | DIASTOLIC BLOOD PRESSURE: 92 MMHG | SYSTOLIC BLOOD PRESSURE: 145 MMHG | WEIGHT: 154 LBS | HEIGHT: 62 IN | OXYGEN SATURATION: 97 % | HEART RATE: 59 BPM

## 2024-10-07 DIAGNOSIS — R19.5 LOOSE STOOLS: Primary | ICD-10-CM

## 2024-10-07 DIAGNOSIS — Z12.11 ENCOUNTER FOR SCREENING FOR MALIGNANT NEOPLASM OF COLON: ICD-10-CM

## 2024-10-07 DIAGNOSIS — K21.9 GASTROESOPHAGEAL REFLUX DISEASE, UNSPECIFIED WHETHER ESOPHAGITIS PRESENT: ICD-10-CM

## 2024-10-07 PROCEDURE — 99204 OFFICE O/P NEW MOD 45 MIN: CPT | Performed by: NURSE PRACTITIONER

## 2024-10-07 NOTE — PROGRESS NOTES
Assessment/Plan   Gwen Nice is a 59-year-old female with medical history significant for HTN, HLD, GERD, OAB and anxiety and depression presents to the GI office for loose stool x 10 years and longstanding history of GERD.      Loose stools associated with abdominal cramping  Longstanding GERD on PPI x 20 years    Recommend EGD and colonoscopy with random biopsies to be done  MiraLAX prep  Labs reviewed  Patient informed she needs a  the day of the procedure  Patient informed to avoid alcohol and marijuana 2 days prior to procedure  Omeprazole 40 mg p.o. once daily, patient instructed to take medication 30 minutes before first meal  Avoid aspirin, Excedrin, Advil, Aleve, ibuprofen, Motrin, Naprosyn, naproxen  Antireflux lifestyle  Recommend fiber supplement once daily  Follow-up with GI 2 weeks after EGD and colonoscopy to review results    RACHEL Velez-HITESH      Subjective     History of Present Illness:   Gwen Nice is a 59-year-old female with medical history significant for HTN, HLD, GERD, OAB and anxiety and depression presents to the GI office for loose stool x 10 years.  Patient has never had colonoscopy.  She has had Cologuard test in the past which were negative.  Patient reports having loose stools every day, waking up with bowel urgency and loose stool.  She denies unintentional weight loss, black or bloody stool.  Patient reports having anywhere from 2-5 loose bowel movements a day.  Patient also has a longstanding history of GERD, taking omeprazole 10 to 40 mg p.o. once daily for over 20 years.  Patient reports having heartburn and reflux symptoms if she misses doses.  Denies dysphagia or odontophagia.  No black or bloody stools.  Never had EGD.  Patient does take ibuprofen up to 3 times daily as needed for chronic pain or toothache.  No family history of CRC or IBD.  Patient is a former smoker, quit smoking many years ago.  Patient does drink 1-2 alcoholic beverages a day usually  gin and tonic.  She also reports using marijuana daily.    Review of Systems  ROS Negative unless otherwise stated above.    Past Medical History   has a past medical history of Anxiety, Arthritis, Depression, GERD (gastroesophageal reflux disease), Hyperlipidemia, Hypertension, Joint pain, PONV (postoperative nausea and vomiting), Postmenopausal, Sinusitis, Snores, and Stress incontinence.     Social History   reports that she has quit smoking. Her smoking use included cigarettes. She has a 0.8 pack-year smoking history. She has never used smokeless tobacco. She reports current alcohol use of about 8.0 standard drinks of alcohol per week. She reports current drug use. Frequency: 7.00 times per week. Drug: Marijuana.     Family History  family history includes Arthritis in her mother; Cancer in her father.     Allergies  Allergies   Allergen Reactions    Aripiprazole Anaphylaxis    Bupropion Anaphylaxis and Swelling    Vancomycin Nausea And Vomiting     As of 5/25/20    Bee Venom Protein (Honey Bee) Swelling    Piperacillin-Tazobactam Swelling    Sulfa (Sulfonamide Antibiotics) Hives       Medications  Current Outpatient Medications   Medication Instructions    amLODIPine (NORVASC) 10 mg, oral, Daily    atorvastatin (LIPITOR) 10 mg, oral, Daily    cholecalciferol, vitamin D3, 250 mcg (10,000 unit) capsule Please take 1 capsule by mouth with LUNCH every day.  Always with food please.  Thank you.    citalopram (CELEXA) 10 mg, oral, Daily    estradiol (Climara) 0.025 mg/24 hr patch 1 patch, transdermal, Once Weekly    furosemide (LASIX) 20 mg, oral, Every 8 hours PRN    ibuprofen (MOTRIN ORAL) oral, 3 times daily PRN    losartan (Cozaar) 25 mg tablet Please take 1 tablet by mouth with SUPPER every evening.  Thank you.    omeprazole (PriLOSEC) 40 mg DR capsule TAKE ONE CAPSULE BY MOUTH EVERY DAY before breakfast    oxybutynin XL (DITROPAN-XL) 10 mg, oral, Daily    progesterone (PROMETRIUM) 100 mg, oral, Nightly         Objective   General: A&Ox3, NAD.  HEENT: AT/NC.   CV: RRR. No murmur.  Resp: CTA bilaterally. No wheezing, rhonchi or rales.   GI: Soft, NT/ND. BSx4.  Extrem: No edema. Pulses intact.  Skin: No Jaundice.   Neuro: No focal deficits.   Psych: Normal mood and affect.     Lab Results   Component Value Date    WBC 4.5 09/12/2024    WBC 5.3 08/02/2024    WBC 6.0 01/26/2024    HGB 13.0 09/12/2024    HGB 12.7 08/02/2024    HGB 12.5 01/26/2024    HCT 37.6 09/12/2024    HCT 36.5 08/02/2024    HCT 36.5 01/26/2024     09/12/2024     08/02/2024     01/26/2024     Lab Results   Component Value Date     09/12/2024    K 3.6 09/12/2024     09/12/2024    CO2 25 09/12/2024    BUN 21 09/12/2024    CREATININE 0.76 09/12/2024    GLUCOSE 130 (H) 09/12/2024    CALCIUM 9.0 09/12/2024       Lab Results   Component Value Date    ALKPHOS 55 09/12/2024    ALKPHOS 58 08/02/2024    ALKPHOS 63 01/26/2024    BILITOT 0.7 09/12/2024    BILITOT 0.6 08/02/2024    BILITOT 0.7 01/26/2024    PROT 6.5 09/12/2024    PROT 6.7 08/02/2024    PROT 7.3 01/26/2024    ALT 23 09/12/2024    ALT 17 08/02/2024    ALT 21 01/26/2024    AST 14 09/12/2024    AST 14 08/02/2024    AST 18 01/26/2024

## 2024-10-29 ENCOUNTER — APPOINTMENT (OUTPATIENT)
Dept: ORTHOPEDIC SURGERY | Facility: CLINIC | Age: 59
End: 2024-10-29
Payer: COMMERCIAL

## 2024-10-29 PROBLEM — I34.1 MVP (MITRAL VALVE PROLAPSE): Status: ACTIVE | Noted: 2024-10-29

## 2024-10-29 PROBLEM — H18.523: Status: ACTIVE | Noted: 2021-12-13

## 2024-10-29 PROBLEM — G47.30 MILD SLEEP APNEA: Status: ACTIVE | Noted: 2022-01-19

## 2024-10-30 ENCOUNTER — APPOINTMENT (OUTPATIENT)
Dept: PRIMARY CARE | Facility: CLINIC | Age: 59
End: 2024-10-30
Payer: COMMERCIAL

## 2024-11-02 DIAGNOSIS — I10 ESSENTIAL HYPERTENSION: ICD-10-CM

## 2024-11-02 DIAGNOSIS — R60.0 BILATERAL EDEMA OF LOWER EXTREMITY: ICD-10-CM

## 2024-11-02 DIAGNOSIS — M54.50 LOW BACK PAIN, UNSPECIFIED BACK PAIN LATERALITY, UNSPECIFIED CHRONICITY, UNSPECIFIED WHETHER SCIATICA PRESENT: ICD-10-CM

## 2024-11-02 DIAGNOSIS — F41.9 ANXIETY: ICD-10-CM

## 2024-11-05 RX ORDER — FUROSEMIDE 20 MG/1
TABLET ORAL
Qty: 30 TABLET | Refills: 0 | Status: SHIPPED | OUTPATIENT
Start: 2024-11-05

## 2024-11-05 RX ORDER — CELECOXIB 200 MG/1
200 CAPSULE ORAL DAILY
Qty: 30 CAPSULE | Refills: 0 | Status: SHIPPED | OUTPATIENT
Start: 2024-11-05

## 2024-11-05 RX ORDER — LOSARTAN POTASSIUM 25 MG/1
TABLET ORAL
Qty: 100 TABLET | Refills: 0 | Status: SHIPPED | OUTPATIENT
Start: 2024-11-05

## 2024-11-05 RX ORDER — CITALOPRAM 10 MG/1
10 TABLET ORAL DAILY
Qty: 30 TABLET | Refills: 0 | Status: SHIPPED | OUTPATIENT
Start: 2024-11-05

## 2024-11-06 ENCOUNTER — ANESTHESIA EVENT (OUTPATIENT)
Dept: GASTROENTEROLOGY | Facility: EXTERNAL LOCATION | Age: 59
End: 2024-11-06

## 2024-11-15 ENCOUNTER — APPOINTMENT (OUTPATIENT)
Dept: ORTHOPEDIC SURGERY | Facility: CLINIC | Age: 59
End: 2024-11-15
Payer: COMMERCIAL

## 2024-11-15 ENCOUNTER — ANESTHESIA (OUTPATIENT)
Dept: GASTROENTEROLOGY | Facility: EXTERNAL LOCATION | Age: 59
End: 2024-11-15

## 2024-11-15 ENCOUNTER — APPOINTMENT (OUTPATIENT)
Dept: GASTROENTEROLOGY | Facility: EXTERNAL LOCATION | Age: 59
End: 2024-11-15
Payer: COMMERCIAL

## 2024-11-15 VITALS
WEIGHT: 154 LBS | HEART RATE: 68 BPM | OXYGEN SATURATION: 96 % | DIASTOLIC BLOOD PRESSURE: 73 MMHG | SYSTOLIC BLOOD PRESSURE: 153 MMHG | BODY MASS INDEX: 28.34 KG/M2 | RESPIRATION RATE: 20 BRPM | HEIGHT: 62 IN | TEMPERATURE: 97.9 F

## 2024-11-15 DIAGNOSIS — K21.9 GASTROESOPHAGEAL REFLUX DISEASE, UNSPECIFIED WHETHER ESOPHAGITIS PRESENT: ICD-10-CM

## 2024-11-15 DIAGNOSIS — Z12.11 ENCOUNTER FOR SCREENING FOR MALIGNANT NEOPLASM OF COLON: ICD-10-CM

## 2024-11-15 DIAGNOSIS — R19.5 LOOSE STOOLS: ICD-10-CM

## 2024-11-15 PROCEDURE — 45380 COLONOSCOPY AND BIOPSY: CPT | Performed by: INTERNAL MEDICINE

## 2024-11-15 PROCEDURE — 43235 EGD DIAGNOSTIC BRUSH WASH: CPT | Performed by: INTERNAL MEDICINE

## 2024-11-15 RX ORDER — LIDOCAINE HYDROCHLORIDE 20 MG/ML
INJECTION, SOLUTION INFILTRATION; PERINEURAL AS NEEDED
Status: DISCONTINUED | OUTPATIENT
Start: 2024-11-15 | End: 2024-11-15

## 2024-11-15 RX ORDER — PROPOFOL 10 MG/ML
INJECTION, EMULSION INTRAVENOUS AS NEEDED
Status: DISCONTINUED | OUTPATIENT
Start: 2024-11-15 | End: 2024-11-15

## 2024-11-15 RX ORDER — SODIUM CHLORIDE 9 MG/ML
INJECTION, SOLUTION INTRAVENOUS CONTINUOUS PRN
Status: DISCONTINUED | OUTPATIENT
Start: 2024-11-15 | End: 2024-11-15

## 2024-11-15 RX ORDER — ONDANSETRON HYDROCHLORIDE 2 MG/ML
4 INJECTION, SOLUTION INTRAVENOUS ONCE AS NEEDED
Status: DISCONTINUED | OUTPATIENT
Start: 2024-11-15 | End: 2024-11-16 | Stop reason: HOSPADM

## 2024-11-15 RX ORDER — ONDANSETRON HYDROCHLORIDE 2 MG/ML
INJECTION, SOLUTION INTRAVENOUS AS NEEDED
Status: DISCONTINUED | OUTPATIENT
Start: 2024-11-15 | End: 2024-11-15

## 2024-11-15 SDOH — HEALTH STABILITY: MENTAL HEALTH: CURRENT SMOKER: 1

## 2024-11-15 ASSESSMENT — PAIN SCALES - GENERAL
PAIN_LEVEL: 0
PAINLEVEL_OUTOF10: 0 - NO PAIN

## 2024-11-15 ASSESSMENT — COLUMBIA-SUICIDE SEVERITY RATING SCALE - C-SSRS
2. HAVE YOU ACTUALLY HAD ANY THOUGHTS OF KILLING YOURSELF?: NO
1. IN THE PAST MONTH, HAVE YOU WISHED YOU WERE DEAD OR WISHED YOU COULD GO TO SLEEP AND NOT WAKE UP?: NO
6. HAVE YOU EVER DONE ANYTHING, STARTED TO DO ANYTHING, OR PREPARED TO DO ANYTHING TO END YOUR LIFE?: NO

## 2024-11-15 ASSESSMENT — PAIN - FUNCTIONAL ASSESSMENT
PAIN_FUNCTIONAL_ASSESSMENT: 0-10

## 2024-11-15 NOTE — H&P
Outpatient Hospital Procedure H&P    Patient Profile-Procedures  Initial Info  Patient Demographics  Name Gwen Nice  Date of Birth 1965  MRN [id number]  Address   [address] [facility] [address] [id number] [facility] [address]    Primary Phone Number [phone]  Secondary Phone Number    PCP Pat Schuler    Procedure(s):  EGD, colonoscopy  Primary contact name and number   Extended Emergency Contact Information  Primary Emergency Contact: Peter Iglesias   [place]  Mobile Phone: [phone]  Relation: Sibling   needed? No  Secondary Emergency Contact: Lena Cotter  Home Phone: [phone]  Mobile Phone: [phone]  Relation: Mother  Preferred language: English   needed? No    General Health  Weight   Vitals:    11/15/24 1002   Weight: 69.9 kg (154 lb)     BMI Body mass index is 28.17 kg/m².    Allergies  Allergies   Allergen Reactions    Aripiprazole Anaphylaxis    Bupropion Anaphylaxis and Swelling    Vancomycin Nausea And Vomiting     As of 5/25/20    Bee Venom Protein (Honey Bee) Swelling    Piperacillin-Tazobactam Swelling    Sulfa (Sulfonamide Antibiotics) Hives       Past Medical History   Past Medical History:   Diagnosis Date    Anxiety     Arthritis     Depression     GERD (gastroesophageal reflux disease)     Hyperlipidemia     Hypertension     Joint pain     PONV (postoperative nausea and vomiting)     Postmenopausal     Sinusitis     Snores     Stress incontinence        Provider assessment  Diagnosis: gerd, diarrhea    Medication Reviewed - yes  Prior to Admission medications    Medication Sig Start Date End Date Taking? Authorizing Provider   amLODIPine (Norvasc) 10 mg tablet Take 1 tablet (10 mg) by mouth once daily. 7/23/24  Yes Felton Urbano MD   atorvastatin (Lipitor) 10 mg tablet Take 1 tablet (10 mg) by mouth once daily. 7/23/24  Yes Felton Urbano MD   cholecalciferol, vitamin D3, 250 mcg (10,000 unit) capsule  Please take 1 capsule by mouth with LUNCH every day.  Always with food please.  Thank you. 4/12/24  Yes Felton Urbano MD   citalopram (CeleXA) 10 mg tablet TAKE ONE TABLET BY MOUTH EVERY DAY 11/5/24  Yes Felton Urbano MD   estradiol (Climara) 0.025 mg/24 hr patch Place 1 patch over 7 days on the skin 1 (one) time per week. 6/2/24  Yes Radames Lerma MD   losartan (Cozaar) 25 mg tablet TAKE ONE TABLET BY MOUTH WITH DINNER EVERY EVENING 11/5/24  Yes Felton Urbano MD   omeprazole (PriLOSEC) 40 mg DR capsule TAKE ONE CAPSULE BY MOUTH EVERY DAY before breakfast 7/23/24  Yes Felton Urbano MD   oxybutynin XL (Ditropan-XL) 10 mg 24 hr tablet Take 1 tablet (10 mg) by mouth once daily. 5/30/24  Yes Radames Lerma MD   progesterone (Prometrium) 100 mg capsule Take 1 capsule (100 mg) by mouth once daily at bedtime. 5/30/24  Yes Radames Lerma MD   celecoxib (CeleBREX) 200 mg capsule TAKE ONE CAPSULE BY MOUTH DAILY 11/5/24   Yulia Stover PA-C   furosemide (Lasix) 20 mg tablet TAKE ONE TABLET BY MOUTH EVERY 8 HOURS AS NEEDED (BILATERAL EDEMA) 11/5/24   Felton Urbano MD   ibuprofen (MOTRIN ORAL) Take by mouth 3 times a day as needed.    Historical Provider, MD       Physical Exam  Vitals:    11/15/24 1002   BP: (!) 147/96   Pulse: 95   Resp: 18   Temp: 36.6 °C (97.9 °F)   SpO2: 99%        General: A&Ox3, NAD.  CV: RRR. No murmur.  Resp: CTA bilaterally. No wheezing, rhonchi or rales.   Extrem: No edema.       Oropharyngeal Classification II (hard and soft palate, upper portion of tonsils and uvula visible)  ASA PS Classification 2  Sedation Plan Deep  Procedure Plan - pre-procedural (re)assesment completed by physician:  discharge/transfer patient when discharge criteria met    Geremias Lewis MD  11/15/2024 10:36 AM

## 2024-11-15 NOTE — ANESTHESIA PREPROCEDURE EVALUATION
Patient: Gwen Nice    Procedure Information       Anesthesia Start Date/Time: 11/15/24 1038    Scheduled providers: Geremias Lewis MD; Renae Barron RN    Procedures:       COLONOSCOPY      EGD    Location: [facility]            Relevant Problems   Cardiac   (+) Essential hypertension   (+) Hypercholesterolemia with hypertriglyceridemia   (+) MVP (mitral valve prolapse)      Neuro   (+) Anxiety   (+) Depression with anxiety      GI   (+) Gastroesophageal reflux disease   (+) Gastroesophageal reflux disease with esophagitis without hemorrhage      Endocrine   (+) Class 1 obesity due to excess calories with serious comorbidity and body mass index (BMI) of 30.0 to 30.9 in adult      Musculoskeletal   (+) Primary osteoarthritis involving multiple joints   (+) Primary osteoarthritis of right knee      ID   (+) Felon of finger   (+) Subacute osteomyelitis of right hand       Clinical information reviewed:   Tobacco  Allergies  Meds   Med Hx  Surg Hx  OB Status  Fam Hx  Soc   Hx        NPO Detail:  NPO/Void Status  Date of Last Liquid: 11/15/24  Time of Last Liquid: 0500  Date of Last Solid: 11/13/24  Time of Last Solid: 1800         Physical Exam    Airway  Mallampati: II  TM distance: >3 FB  Neck ROM: full     Cardiovascular - normal exam     Dental - normal exam     Pulmonary - normal exam  Breath sounds clear to auscultation     Abdominal        Anesthesia Plan    History of general anesthesia?: yes  History of complications of general anesthesia?: no    ASA 2     MAC     The patient is a current smoker.  Patient was previously instructed to abstain from smoking on day of procedure.  Patient did not smoke on day of procedure.    intravenous induction   Anesthetic plan and risks discussed with patient.    Plan discussed with CRNA.

## 2024-11-15 NOTE — ANESTHESIA POSTPROCEDURE EVALUATION
Patient: Gwen Nice    Procedure Summary       Date: 11/15/24 Room / Location: [facility]    Anesthesia Start: 1038 Anesthesia Stop:     Procedures:       COLONOSCOPY      EGD Diagnosis:       Encounter for screening for malignant neoplasm of colon      Gastroesophageal reflux disease, unspecified whether esophagitis present      Loose stools    Scheduled Providers: Geremias Lewis MD; Renae Barron RN Responsible Provider: BEATRIZ Roberson    Anesthesia Type: MAC ASA Status: 2            Anesthesia Type: MAC    Vitals Value Taken Time   /82 11/15/24 1116   Temp 36.6 °C (97.9 °F) 11/15/24 1116   Pulse 74 11/15/24 1116   Resp 15 11/15/24 1116   SpO2 100 % 11/15/24 1116       Anesthesia Post Evaluation    Patient location during evaluation: bedside  Patient participation: complete - patient cannot participate  Level of consciousness: awake and responsive to verbal stimuli  Pain score: 0  Pain management: adequate  Airway patency: patent  Cardiovascular status: acceptable and hemodynamically stable  Respiratory status: acceptable  Hydration status: acceptable  Postoperative Nausea and Vomiting: none    No notable events documented.

## 2024-11-15 NOTE — DISCHARGE INSTRUCTIONS

## 2024-11-20 DIAGNOSIS — E78.2 HYPERCHOLESTEROLEMIA WITH HYPERTRIGLYCERIDEMIA: ICD-10-CM

## 2024-11-20 DIAGNOSIS — M54.50 LOW BACK PAIN, UNSPECIFIED BACK PAIN LATERALITY, UNSPECIFIED CHRONICITY, UNSPECIFIED WHETHER SCIATICA PRESENT: ICD-10-CM

## 2024-11-20 DIAGNOSIS — K21.9 GASTROESOPHAGEAL REFLUX DISEASE, UNSPECIFIED WHETHER ESOPHAGITIS PRESENT: ICD-10-CM

## 2024-11-20 DIAGNOSIS — E55.9 VITAMIN D DEFICIENCY: ICD-10-CM

## 2024-11-20 DIAGNOSIS — I10 ESSENTIAL HYPERTENSION: ICD-10-CM

## 2024-11-20 RX ORDER — CELECOXIB 200 MG/1
200 CAPSULE ORAL DAILY
Qty: 30 CAPSULE | Refills: 0 | Status: SHIPPED | OUTPATIENT
Start: 2024-11-20

## 2024-11-20 RX ORDER — ATORVASTATIN CALCIUM 10 MG/1
10 TABLET, FILM COATED ORAL DAILY
Qty: 30 TABLET | Refills: 0 | Status: SHIPPED | OUTPATIENT
Start: 2024-11-20

## 2024-11-20 RX ORDER — OMEPRAZOLE 40 MG/1
CAPSULE, DELAYED RELEASE ORAL
Qty: 30 CAPSULE | Refills: 0 | Status: SHIPPED | OUTPATIENT
Start: 2024-11-20

## 2024-11-20 RX ORDER — VIT C/E/ZN/COPPR/LUTEIN/ZEAXAN 250MG-90MG
CAPSULE ORAL
Qty: 30 CAPSULE | Refills: 0 | Status: SHIPPED | OUTPATIENT
Start: 2024-11-20

## 2024-11-20 RX ORDER — AMLODIPINE BESYLATE 10 MG/1
10 TABLET ORAL DAILY
Qty: 30 TABLET | Refills: 0 | Status: SHIPPED | OUTPATIENT
Start: 2024-11-20

## 2024-11-29 ENCOUNTER — APPOINTMENT (OUTPATIENT)
Dept: ORTHOPEDIC SURGERY | Facility: CLINIC | Age: 59
End: 2024-11-29
Payer: COMMERCIAL

## 2024-11-29 LAB
LABORATORY COMMENT REPORT: NORMAL
PATH REPORT.FINAL DX SPEC: NORMAL
PATH REPORT.GROSS SPEC: NORMAL
PATH REPORT.RELEVANT HX SPEC: NORMAL
PATH REPORT.TOTAL CANCER: NORMAL

## 2024-12-02 ENCOUNTER — APPOINTMENT (OUTPATIENT)
Dept: GASTROENTEROLOGY | Facility: CLINIC | Age: 59
End: 2024-12-02
Payer: COMMERCIAL

## 2024-12-06 ENCOUNTER — OFFICE VISIT (OUTPATIENT)
Dept: ORTHOPEDIC SURGERY | Facility: CLINIC | Age: 59
End: 2024-12-06
Payer: COMMERCIAL

## 2024-12-06 DIAGNOSIS — M54.16 LUMBAR RADICULOPATHY: Primary | ICD-10-CM

## 2024-12-06 PROCEDURE — 99213 OFFICE O/P EST LOW 20 MIN: CPT | Performed by: ORTHOPAEDIC SURGERY

## 2024-12-06 NOTE — PROGRESS NOTES
Gwen Nice is a 59 y.o. female who presents for Follow-up and Worker's Compensation of the Lower Back.    HPI:  59-year-old female here for follow-up.  [facility] patient.  Therapy really helped.  She denies any fever chills nausea vomiting night sweats.  She has no bowel or bladder complaints.    Physical exam:  Well-nourished, well kept.No lymphangitis or lymphadenopathy in the examined extremities.  Gait normal.  Can stand on heels and toes.   Examination of the back shows tenderness in the paraspinous musculature.  There is no significant decreased range of motion in all directions due to guarding/muscle spasms and pain at extremes.  There is good strength and no instability.  Examination of the lower extremities reveals no point tenderness, swelling, or deformity.  Range of motion of the hips, knees, and ankles are full without crepitance, instability, or exacerbation of pain.  Strength is 5/5 throughout.  No redness, abrasions, or lesions on extremities  Gross sensation intact in the extremities.  Affect normal.  Alert and oriented ×3.  Coordination normal.    Assessment:  59-year-old female here for follow-up after physical therapy.  She is a [facility] patient.  Patient states that therapy really helped.  She is at least 50% better.  Most of the radicular pain down her left leg has resolved.  She still has some low back pain and a little bit of left buttock pain.  She is continuing to do therapy, she is happy with where she is at and she states that she can live with it.     For complete plan and/or surgical details, please refer to Dr. Kimble's portion of this split dictation.    -Chan Cote PA-C    In a face-to-face encounter, I performed a history and physical examination, discussed pertinent diagnostic studies if indicated, and discussed diagnosis and management strategies with both the patient and the midlevel provider.  I reviewed the midlevel's note and agree with the documented findings and plan of  care.    Patient improving.  She is 50% better.  She still has some back pain and left leg radicular symptoms but she is heading in the right direction.  We reviewed her physical therapy notes.  At this point she wants to hold off on surgery.  She will continue with PT and wean to home program.  If she does not get the relief she is looking for if she takes a turn for the worse to come back and see us.  An operation on her would be an L4-5 laminectomy with a globus retractor coming up in the left side.  We would do interbody cage instrumentation and posterolateral fusion that level.  We then swing up to the L3-4 level and do a laminectomy on the left at that level.    Kevin Kimble MD  Orthopedic surgery

## 2024-12-07 DIAGNOSIS — F41.9 ANXIETY: ICD-10-CM

## 2024-12-10 NOTE — RESULT ENCOUNTER NOTE
Would like to discuss pathology results with patient. Please set up follow up appt. Virtual, phone or in office. Thank you

## 2024-12-11 ENCOUNTER — TELEPHONE (OUTPATIENT)
Dept: GASTROENTEROLOGY | Facility: CLINIC | Age: 59
End: 2024-12-11
Payer: COMMERCIAL

## 2024-12-11 RX ORDER — CITALOPRAM 10 MG/1
10 TABLET ORAL DAILY
Qty: 30 TABLET | Refills: 0 | Status: SHIPPED | OUTPATIENT
Start: 2024-12-11

## 2024-12-11 NOTE — TELEPHONE ENCOUNTER
Patient can only do a Virtual visit on Friday afternoon due to her work schedule.  Would you be able to do a virtual visit on  Friday Dec 20?

## 2024-12-11 NOTE — TELEPHONE ENCOUNTER
Left a voice mail. Brunilda Would like to discuss pathology results with patient. Please set up follow up appt.

## 2024-12-16 ENCOUNTER — TELEPHONE (OUTPATIENT)
Dept: GASTROENTEROLOGY | Facility: CLINIC | Age: 59
End: 2024-12-16
Payer: COMMERCIAL

## 2024-12-16 NOTE — TELEPHONE ENCOUNTER
I left a message to call [phone] to schedule a virtual appointment with ANA LAURA White.  Review EGD results.

## 2025-01-10 ENCOUNTER — APPOINTMENT (OUTPATIENT)
Dept: PRIMARY CARE | Facility: CLINIC | Age: 60
End: 2025-01-10
Payer: COMMERCIAL

## 2025-01-10 VITALS
HEART RATE: 62 BPM | BODY MASS INDEX: 27.23 KG/M2 | DIASTOLIC BLOOD PRESSURE: 77 MMHG | OXYGEN SATURATION: 97 % | SYSTOLIC BLOOD PRESSURE: 138 MMHG | WEIGHT: 148 LBS | HEIGHT: 62 IN

## 2025-01-10 DIAGNOSIS — E66.3 OVERWEIGHT WITH BODY MASS INDEX (BMI) OF 27 TO 27.9 IN ADULT: ICD-10-CM

## 2025-01-10 DIAGNOSIS — E78.2 HYPERCHOLESTEROLEMIA WITH HYPERTRIGLYCERIDEMIA: ICD-10-CM

## 2025-01-10 DIAGNOSIS — E55.9 VITAMIN D DEFICIENCY: ICD-10-CM

## 2025-01-10 DIAGNOSIS — I10 ESSENTIAL HYPERTENSION: Primary | ICD-10-CM

## 2025-01-10 DIAGNOSIS — F41.8 DEPRESSION WITH ANXIETY: ICD-10-CM

## 2025-01-10 DIAGNOSIS — K21.9 GASTROESOPHAGEAL REFLUX DISEASE, UNSPECIFIED WHETHER ESOPHAGITIS PRESENT: ICD-10-CM

## 2025-01-10 DIAGNOSIS — R73.9 HYPERGLYCEMIA: ICD-10-CM

## 2025-01-10 DIAGNOSIS — D64.9 ANEMIA, NORMOCYTIC NORMOCHROMIC: ICD-10-CM

## 2025-01-10 DIAGNOSIS — Z12.31 SCREENING MAMMOGRAM FOR BREAST CANCER: ICD-10-CM

## 2025-01-10 DIAGNOSIS — D12.2 ADENOMATOUS POLYP OF ASCENDING COLON: Chronic | ICD-10-CM

## 2025-01-10 PROCEDURE — 3008F BODY MASS INDEX DOCD: CPT | Performed by: INTERNAL MEDICINE

## 2025-01-10 PROCEDURE — 3075F SYST BP GE 130 - 139MM HG: CPT | Performed by: INTERNAL MEDICINE

## 2025-01-10 PROCEDURE — 3078F DIAST BP <80 MM HG: CPT | Performed by: INTERNAL MEDICINE

## 2025-01-10 PROCEDURE — 99214 OFFICE O/P EST MOD 30 MIN: CPT | Performed by: INTERNAL MEDICINE

## 2025-01-10 RX ORDER — LOSARTAN POTASSIUM 25 MG/1
TABLET ORAL
Qty: 100 TABLET | Refills: 0 | Status: SHIPPED | OUTPATIENT
Start: 2025-01-10

## 2025-01-10 RX ORDER — AMLODIPINE BESYLATE 10 MG/1
10 TABLET ORAL DAILY
Qty: 90 TABLET | Refills: 1 | Status: SHIPPED | OUTPATIENT
Start: 2025-01-10

## 2025-01-10 RX ORDER — OMEPRAZOLE 40 MG/1
CAPSULE, DELAYED RELEASE ORAL
Qty: 90 CAPSULE | Refills: 1 | Status: SHIPPED | OUTPATIENT
Start: 2025-01-10

## 2025-01-10 RX ORDER — ATORVASTATIN CALCIUM 10 MG/1
10 TABLET, FILM COATED ORAL DAILY
Qty: 90 TABLET | Refills: 0 | Status: SHIPPED | OUTPATIENT
Start: 2025-01-10

## 2025-01-10 RX ORDER — VIT C/E/ZN/COPPR/LUTEIN/ZEAXAN 250MG-90MG
CAPSULE ORAL
Qty: 90 CAPSULE | Refills: 0 | Status: SHIPPED | OUTPATIENT
Start: 2025-01-10

## 2025-01-10 RX ORDER — CITALOPRAM 10 MG/1
10 TABLET ORAL DAILY
Qty: 90 TABLET | Refills: 1 | Status: SHIPPED | OUTPATIENT
Start: 2025-01-10

## 2025-01-10 NOTE — PATIENT INSTRUCTIONS
Thank you very much for coming.  I am very happy to see you again.    I do understand that you have a tendency for some modest swelling of your ankles by the end of the day.  Please let me know if this becomes more noticeable.  In the meantime, you can decrease the chances of bilateral edema by decreasing your SALT intake.  There is also improve your blood pressure control, and also makes your kidneys work more insufficiently.    Also, you can use compression socks.  You should also take advantage of putting her feet up whenever you can.    When you get a chance, start checking her blood pressure in the evening, in a calm and resting state, just as we discussed in the past.  Seated position, feet flat on the floor.  Use the backrest of the chair.  Call me if your heart rate at rest is 90 or higher.  Call me if at rest, the first number of your blood pressure is persistently 145 or higher.  Call me if at rest, the second number of your blood pressure is persistently 88 or higher.  We can make adjustments over the telephone until I see you again.    I had a chance to review your laboratory examination results.  It will be safe to refill most of your medications, especially your CITALOPRAM.  I have sent these to your local pharmacy.    Please make sure to get in touch with Dr. Lerma and have your Pap and pelvic examination done sometime soon.  Also, you will benefit from a MAMMOGRAM sometime soon.    Please continue to stay physically active, and please continue to eat sensibly.  Let us see how you do with diet and exercise.  When I see you again in 2 months, let us do some FASTING lab work and we will discuss options regarding weight management if appropriate.    Please do not miss your appointment with GASTROENTEROLOGY.  They will most likely tell you when you will need to repeat your COLONOSCOPY.    Again, thank very much for coming.  Please do not hesitate to call with questions or concerns.  Please continue to  take care of yourself and your family, and please continue to pray for our recovery from this pandemic.  I hope you had a good [place], and I do wish you a happy new year.    You will benefit from VACCINATIONS, starting with diphtheria/pertussis/tetanus, as we have discussed.  You have had this before, and it will protect you from tetanus.  Highly recommended, every 10 years.    After 2 weeks, please get yourself vaccinated for PNEUMONIA with PREVNAR 20.  This should be good for your whole lifetime!            0  Return in 2 months.  20 minutes please.  Repeat FASTING laboratory examination, then see me soon after.  Reassess hemodynamics, cardiovascular risk, options regarding weight management.  Reassess mood, energy, function, preventive strategies, coordinate with gynecology.  Consider decreasing amlodipine and increasing losartan.            0

## 2025-01-10 NOTE — PROGRESS NOTES
"Subjective   Patient ID: Gwen Nice is a 59 y.o. female who presents for Follow-up (Patient presented today for a 6 week follow up./).    HPI   Compliant with medications, tolerating regimens.  Some modest edema by the end of the day perhaps, dissipating upon recumbency overnight.  No calf tenderness bilaterally.    No pam substernal chest pain, no orthopnea, no paroxysmal nocturnal dyspnea.  Continues to want to improve quality of life, and does not wish harm to self or others.  No headache, blurred vision, no diplopia.  No dysphagia.  States that when she runs out of her psychoactive regimen CITALOPRAM, she becomes very irritable and better, and she would like to avoid running out of medication unnecessarily.    Physically active.  Enjoys her current job which is less physically demanding, especially with history of polyarthralgia, myalgia.  No overlying skin changes.  Appetite preserved, no abdominal distress.  No dysuria, flank, suprapubic pain.  Following with GYNECOLOGY.  No overlying skin changes.    CONSTITUTIONALLY, no fever, no chills.  No night sweats.  No lingering anorexia or nausea.  No apparent lymphadenopathy.  No apparent weight loss.        Review of Systems  Review of systems as in history of present illness, and otherwise, reviewed separately as well, and was unremarkable/negative/noncontributory.        Objective   /77 (BP Location: Left arm, Patient Position: Sitting, BP Cuff Size: Adult)   Pulse 62   Ht 1.575 m (5' 2\")   Wt 67.1 kg (148 lb)   SpO2 97%   BMI 27.07 kg/m²     Physical Exam  In good spirits, and eager to maintain and hopefully improve quality of life.  Does not wish harm to self or others.  Not in distress or diaphoresis.  Alert, oriented x 3.  Amiable.  Not unkempt.  Moderately built.  Remaining capable and independent.  Appropriate.    HEAD pink palpebral conjunctivae, anicteric sclerae.  Mucous membranes moist.  NECK supple, no apparent jugular venous " distention.  CARDIOVASCULAR not in distress or diaphoresis.  No bipedal edema.  Regular rate and rhythm.  No murmurs appreciated.  LUNGS not in distress or diaphoresis.  Not using accessory muscles.  Clear to auscultation bilaterally.  ABDOMEN soft, nontender.  BACK no costovertebral angle tenderness.  EXTREMITIES no clubbing, no cyanosis.  NEURO no facial asymmetry.  No apparent cranial nerve deficits.  Romberg negative.  Ambulating without need of assistance.  No apparent focal weakness.  No tremors.  PSYCH receptive, appropriate, and eager to maintain and improve quality of life.        LABORATORY results noted, with COLONOSCOPY results showing ascending and transverse colon polyps, biopsy showing TUBULAR ADENOMA.    In September, history of anemia noted.  Vitamin D 62.  LDL cholesterol 100.  Hemoglobin A1c 5.3.  Current BMI 27.07.        Assessment/Plan   Diagnoses and all orders for this visit:  Essential hypertension  -     amLODIPine (Norvasc) 10 mg tablet; Take 1 tablet (10 mg) by mouth once daily.  -     losartan (Cozaar) 25 mg tablet; TAKE ONE TABLET BY MOUTH WITH DINNER EVERY EVENING  -     Follow Up In Primary Care - Established; Future  -     CBC and Auto Differential; Future  -     Comprehensive Metabolic Panel; Future  -     TSH with reflex to Free T4 if abnormal; Future  -     Magnesium; Future  -     Urinalysis with Reflex Culture and Microscopic; Future  Hypercholesterolemia with hypertriglyceridemia  -     atorvastatin (Lipitor) 10 mg tablet; Take 1 tablet (10 mg) by mouth once daily.  -     Follow Up In Primary Care - Established; Future  -     Comprehensive Metabolic Panel; Future  -     Lipid Panel; Future  Hyperglycemia  -     Follow Up In Primary Care - Established; Future  -     Comprehensive Metabolic Panel; Future  -     Urinalysis with Reflex Culture and Microscopic; Future  -     Hemoglobin A1C; Future  Overweight with body mass index (BMI) of 27 to 27.9 in adult  -     Follow Up In  Primary Care - Established; Future  -     Comprehensive Metabolic Panel; Future  -     TSH with reflex to Free T4 if abnormal; Future  Vitamin D deficiency  -     cholecalciferol, vitamin D3, 250 mcg (10,000 unit) capsule; Please take 1 capsule by mouth with LUNCH every day.  Always with food please.  Thank you.  -     Follow Up In Primary Care - Established; Future  -     Comprehensive Metabolic Panel; Future  -     Vitamin D 25-Hydroxy,Total (for eval of Vitamin D levels); Future  Depression with anxiety  -     citalopram (CeleXA) 10 mg tablet; Take 1 tablet (10 mg) by mouth once daily.  -     Follow Up In Primary Care - Established; Future  -     Comprehensive Metabolic Panel; Future  -     TSH with reflex to Free T4 if abnormal; Future  Gastroesophageal reflux disease, unspecified whether esophagitis present  -     omeprazole (PriLOSEC) 40 mg DR capsule; TAKE ONE CAPSULE BY MOUTH EVERY DAY before breakfast  -     Follow Up In Primary Care - Established; Future  -     CBC and Auto Differential; Future  -     Comprehensive Metabolic Panel; Future  Screening mammogram for breast cancer  -     BI mammo bilateral screening tomosynthesis; Future  -     Follow Up In Primary Care - Established; Future  Anemia, normocytic normochromic  -     Follow Up In Primary Care - Established; Future  -     CBC and Auto Differential; Future  -     Vitamin B12; Future  -     Folate; Future  -     Ferritin; Future  -     Iron and TIBC; Future  Adenomatous polyp of ascending colon  Comments:  ascending and transverse 11/2025  Orders:  -     Follow Up In Primary Care - Established; Future  -     CBC and Auto Differential; Future  -     Comprehensive Metabolic Panel; Future       Thank you very much for coming.  I am very happy to see you again.    I do understand that you have a tendency for some modest swelling of your ankles by the end of the day.  Please let me know if this becomes more noticeable.  In the meantime, you can decrease  the chances of bilateral edema by decreasing your SALT intake.  There is also improve your blood pressure control, and also makes your kidneys work more insufficiently.    Also, you can use compression socks.  You should also take advantage of putting her feet up whenever you can.    When you get a chance, start checking her blood pressure in the evening, in a calm and resting state, just as we discussed in the past.  Seated position, feet flat on the floor.  Use the backrest of the chair.  Call me if your heart rate at rest is 90 or higher.  Call me if at rest, the first number of your blood pressure is persistently 145 or higher.  Call me if at rest, the second number of your blood pressure is persistently 88 or higher.  We can make adjustments over the telephone until I see you again.    I had a chance to review your laboratory examination results.  It will be safe to refill most of your medications, especially your CITALOPRAM.  I have sent these to your local pharmacy.    Please make sure to get in touch with Dr. Lerma and have your Pap and pelvic examination done sometime soon.  Also, you will benefit from a MAMMOGRAM sometime soon.    Please continue to stay physically active, and please continue to eat sensibly.  Let us see how you do with diet and exercise.  When I see you again in 2 months, let us do some FASTING lab work and we will discuss options regarding weight management if appropriate.    Please do not miss your appointment with GASTROENTEROLOGY.  They will most likely tell you when you will need to repeat your COLONOSCOPY.    Again, thank very much for coming.  Please do not hesitate to call with questions or concerns.  Please continue to take care of yourself and your family, and please continue to pray for our recovery from this pandemic.  I hope you had a good [place], and I do wish you a happy new year.    You will benefit from VACCINATIONS, starting with diphtheria/pertussis/tetanus, as we  have discussed.  You have had this before, and it will protect you from tetanus.  Highly recommended, every 10 years.    After 2 weeks, please get yourself vaccinated for PNEUMONIA with PREVNAR 20.  This should be good for your whole lifetime!            0  Return in 2 months.  20 minutes please.  Repeat FASTING laboratory examination, then see me soon after.  Reassess hemodynamics, cardiovascular risk, options regarding weight management.  Reassess mood, energy, function, preventive strategies, coordinate with gynecology.  Consider decreasing amlodipine and increasing losartan.            0

## 2025-01-10 NOTE — ADDENDUM NOTE
Addended by: GENEVA KRAMER on: 1/10/2025 01:09 PM     Modules accepted: Orders, Level of Service

## 2025-01-31 ENCOUNTER — HOSPITAL ENCOUNTER (OUTPATIENT)
Dept: RADIOLOGY | Facility: HOSPITAL | Age: 60
Discharge: HOME | End: 2025-01-31
Payer: COMMERCIAL

## 2025-01-31 VITALS — WEIGHT: 148 LBS | HEIGHT: 62 IN | BODY MASS INDEX: 27.23 KG/M2

## 2025-01-31 DIAGNOSIS — Z12.31 SCREENING MAMMOGRAM FOR BREAST CANCER: ICD-10-CM

## 2025-01-31 PROCEDURE — 77063 BREAST TOMOSYNTHESIS BI: CPT | Performed by: RADIOLOGY

## 2025-01-31 PROCEDURE — 77067 SCR MAMMO BI INCL CAD: CPT

## 2025-01-31 PROCEDURE — 77067 SCR MAMMO BI INCL CAD: CPT | Performed by: RADIOLOGY

## 2025-03-14 ENCOUNTER — APPOINTMENT (OUTPATIENT)
Dept: PRIMARY CARE | Facility: CLINIC | Age: 60
End: 2025-03-14
Payer: COMMERCIAL

## 2025-03-14 VITALS
OXYGEN SATURATION: 96 % | BODY MASS INDEX: 26.31 KG/M2 | DIASTOLIC BLOOD PRESSURE: 81 MMHG | WEIGHT: 143 LBS | HEIGHT: 62 IN | SYSTOLIC BLOOD PRESSURE: 123 MMHG | HEART RATE: 95 BPM

## 2025-03-14 DIAGNOSIS — D64.9 ANEMIA, NORMOCYTIC NORMOCHROMIC: ICD-10-CM

## 2025-03-14 DIAGNOSIS — K21.9 GASTROESOPHAGEAL REFLUX DISEASE, UNSPECIFIED WHETHER ESOPHAGITIS PRESENT: ICD-10-CM

## 2025-03-14 DIAGNOSIS — F41.8 DEPRESSION WITH ANXIETY: ICD-10-CM

## 2025-03-14 DIAGNOSIS — Z12.4 SCREENING FOR CERVICAL CANCER: ICD-10-CM

## 2025-03-14 DIAGNOSIS — D12.2 ADENOMATOUS POLYP OF ASCENDING COLON: Chronic | ICD-10-CM

## 2025-03-14 DIAGNOSIS — Z91.89 FRAMINGHAM CARDIAC RISK <10% IN NEXT 10 YEARS: ICD-10-CM

## 2025-03-14 DIAGNOSIS — E55.9 VITAMIN D DEFICIENCY: ICD-10-CM

## 2025-03-14 DIAGNOSIS — R73.9 HYPERGLYCEMIA: ICD-10-CM

## 2025-03-14 DIAGNOSIS — I10 ESSENTIAL HYPERTENSION: Primary | ICD-10-CM

## 2025-03-14 DIAGNOSIS — E78.2 HYPERCHOLESTEROLEMIA WITH HYPERTRIGLYCERIDEMIA: ICD-10-CM

## 2025-03-14 PROCEDURE — 3008F BODY MASS INDEX DOCD: CPT | Performed by: INTERNAL MEDICINE

## 2025-03-14 PROCEDURE — 99213 OFFICE O/P EST LOW 20 MIN: CPT | Performed by: INTERNAL MEDICINE

## 2025-03-14 PROCEDURE — 3079F DIAST BP 80-89 MM HG: CPT | Performed by: INTERNAL MEDICINE

## 2025-03-14 PROCEDURE — 1036F TOBACCO NON-USER: CPT | Performed by: INTERNAL MEDICINE

## 2025-03-14 PROCEDURE — 3074F SYST BP LT 130 MM HG: CPT | Performed by: INTERNAL MEDICINE

## 2025-03-14 NOTE — PROGRESS NOTES
"Subjective   Patient ID: Gwen Nice is a 59 y.o. female who presents for 2 Month FU (Pt in today for routine 2 month FU).    HPI   Has yet to do fasting laboratory examinations, but in the meantime, compliant with medications, tolerating regimens.  Physically active.  Following with her \"bad carlos\" for her workmen's compensation claim, states that back pain is controlled.  No headache, blurred vision, no diplopia, no dysphagia.  Continues to want to improve quality of life, and does not wish harm to self or others.    Successful in controlling weight, with some modest weight loss.  No abdominal distress.  No dysuria.  No skin changes.  ENDOCRINE with no polyuria, polydipsia, polyphagia.  No blurred vision.  No skin, hair, nail changes.  No dramatic weight loss or weight gain.      States that \"I bruise easily.\"  Left elbow with bruise.  Otherwise, no gum or nose bleeding.  No apparent blood in urine or stool.  No lightheadedness, no palpitations.  No pam substernal chest pain, no orthopnea, no paroxysmal nocturnal dyspnea.  No particular cough or sputum production.  Eager to improve quality of life.  Does not wish harm to self or others.  CONSTITUTIONALLY, no fever, no chills.  No night sweats.  No lingering anorexia or nausea.  No apparent lymphadenopathy.  No apparent weight loss.    Scheduled to see GYNECOLOGY soon.        Review of Systems  Review of systems as in history of present illness, and otherwise, reviewed separately as well, and was unremarkable/negative/noncontributory.        Objective   /81 (BP Location: Left arm, Patient Position: Sitting, BP Cuff Size: Adult)   Pulse 95   Ht 1.58 m (5' 2.21\")   Wt 64.9 kg (143 lb)   SpO2 96%   BMI 25.98 kg/m²     Physical Exam  In good spirits.  Not in distress or diaphoresis.  Alert, oriented x 3.  Amiable.  Not unkempt.  Does want to improve quality of life, and does not wish harm to self or others.  Moderately built.  Remaining independent, " capable.  Appropriate.    HEAD pink palpebral conjunctivae, anicteric sclerae.  NECK supple, no apparent jugular venous distention.  CARDIOVASCULAR not in distress or diaphoresis.  No bipedal edema.  LUNGS not in distress or diaphoresis.  Not using accessory muscles.  ABDOMEN soft, nontender.  BACK no costovertebral angle tenderness.  EXTREMITIES no clubbing, no cyanosis.  NEURO no facial asymmetry.  No apparent cranial nerve deficits.  Romberg negative.  Ambulating without need of assistance.  No apparent focal weakness.  No tremors.  PSYCH receptive, appropriate, and eager to maintain and improve quality of life.        LABORATORY results to be done soon.  I will then do her refills.        Assessment/Plan   Diagnoses and all orders for this visit:  Essential hypertension  -     Follow Up In Primary Care - Established  -     Follow Up In Primary Care - Established; Future  Hypercholesterolemia with hypertriglyceridemia  -     Follow Up In Primary Care - Established; Future  Hyperglycemia  -     Follow Up In Primary Care - Established; Future  [place] cardiac risk <10% in next 10 years  -     Follow Up In Primary Care - Established; Future  Vitamin D deficiency  -     Follow Up In Primary Care - Established; Future  Depression with anxiety  -     Follow Up In Primary Care - Established; Future  Anemia, normocytic normochromic  -     Follow Up In Primary Care - Established; Future  Gastroesophageal reflux disease, unspecified whether esophagitis present  -     Follow Up In Primary Care - Established; Future  Adenomatous polyp of ascending colon  Comments:  ascending and transverse 11/2025  Orders:  -     Follow Up In Primary Care - Established; Future  Screening for cervical cancer  -     Follow Up In Primary Care - Established; Future       Thank you very much for coming.  I am very happy to see you.    Let us wait on your FASTING laboratory results.  I will send word regarding results and possible changes.  I  will also do your refills for at least 6 months.    In the meantime, please keep your appointment with GYNECOLOGY.    Please continue your efforts!  Diet and exercise.  You has been losing weight modestly but consistently!  This is the best way to lose weight.    Please come back in 6 months.  It will be time for your yearly PHYSICAL examination.  Perhaps we will need lab work again.  Just get your lab work done soon, and I will send word regarding results and possible changes.  I will do your refills.    If you change your mind, please get yourself vaccinated for TETANUS.  Highly recommended, to be paid for by your insurance.  Safe, protective for 10 years, protect you from bad pharyngitis, whooping cough, and tetanus.  You can get this from your local friendly pharmacy.    Thank you for staying active.  Thank you for taking your medications.  See you in 6 months.  Please call or text sooner as needed.  I hope you have a happy Easter!            0  Return in 6 months.  40 minutes please.  Yearly PHYSICAL examination.  Consider repeat fasting laboratory examinations.  Coordinate with gynecology.  Reassess hemodynamics, cardiovascular risk, preventive strategies.  Reassess mood, energy, function, cognition.  Coordinate with chronic pain/Workmen's .            0

## 2025-03-14 NOTE — PATIENT INSTRUCTIONS
Thank you very much for coming.  I am very happy to see you.    Let us wait on your FASTING laboratory results.  I will send word regarding results and possible changes.  I will also do your refills for at least 6 months.    In the meantime, please keep your appointment with GYNECOLOGY.    Please continue your efforts!  Diet and exercise.  You has been losing weight modestly but consistently!  This is the best way to lose weight.    Please come back in 6 months.  It will be time for your yearly PHYSICAL examination.  Perhaps we will need lab work again.  Just get your lab work done soon, and I will send word regarding results and possible changes.  I will do your refills.    If you change your mind, please get yourself vaccinated for TETANUS.  Highly recommended, to be paid for by your insurance.  Safe, protective for 10 years, protect you from bad pharyngitis, whooping cough, and tetanus.  You can get this from your local friendly pharmacy.    Thank you for staying active.  Thank you for taking your medications.  See you in 6 months.  Please call or text sooner as needed.  I hope you have a happy Easter!            0  Return in 6 months.  40 minutes please.  Yearly PHYSICAL examination.  Consider repeat fasting laboratory examinations.  Coordinate with gynecology.  Reassess hemodynamics, cardiovascular risk, preventive strategies.  Reassess mood, energy, function, cognition.  Coordinate with chronic pain/Workmen's .            0

## 2025-03-21 LAB
APPEARANCE UR: CLEAR
BACTERIA #/AREA URNS HPF: ABNORMAL /HPF
BACTERIA UR CULT: ABNORMAL
BILIRUB UR QL STRIP: NEGATIVE
COLOR UR: YELLOW
GLUCOSE UR QL STRIP: NEGATIVE
HGB UR QL STRIP: NEGATIVE
HYALINE CASTS #/AREA URNS LPF: ABNORMAL /LPF
KETONES UR QL STRIP: NEGATIVE
LEUKOCYTE ESTERASE UR QL STRIP: NEGATIVE
NITRITE UR QL STRIP: NEGATIVE
PH UR STRIP: 7.5 [PH] (ref 5–8)
PROT UR QL STRIP: NEGATIVE
RBC #/AREA URNS HPF: ABNORMAL /HPF
SERVICE CMNT-IMP: ABNORMAL
SP GR UR STRIP: 1.01 (ref 1–1.03)
SQUAMOUS #/AREA URNS HPF: ABNORMAL /HPF
WBC #/AREA URNS HPF: ABNORMAL /HPF

## 2025-03-22 LAB
25(OH)D3+25(OH)D2 SERPL-MCNC: 58 NG/ML (ref 30–100)
ALBUMIN SERPL-MCNC: 4.4 G/DL (ref 3.6–5.1)
ALP SERPL-CCNC: 43 U/L (ref 37–153)
ALT SERPL-CCNC: 14 U/L (ref 6–29)
ANION GAP SERPL CALCULATED.4IONS-SCNC: 7 MMOL/L (CALC) (ref 7–17)
AST SERPL-CCNC: 13 U/L (ref 10–35)
BASOPHILS # BLD AUTO: 27 CELLS/UL (ref 0–200)
BASOPHILS NFR BLD AUTO: 0.4 %
BILIRUB SERPL-MCNC: 0.5 MG/DL (ref 0.2–1.2)
BUN SERPL-MCNC: 19 MG/DL (ref 7–25)
CALCIUM SERPL-MCNC: 9.1 MG/DL (ref 8.6–10.4)
CHLORIDE SERPL-SCNC: 108 MMOL/L (ref 98–110)
CHOLEST SERPL-MCNC: 214 MG/DL
CHOLEST/HDLC SERPL: 1.9 (CALC)
CO2 SERPL-SCNC: 25 MMOL/L (ref 20–32)
CREAT SERPL-MCNC: 0.82 MG/DL (ref 0.5–1.05)
EGFRCR SERPLBLD CKD-EPI 2021: 82 ML/MIN/1.73M2
EOSINOPHIL # BLD AUTO: 101 CELLS/UL (ref 15–500)
EOSINOPHIL NFR BLD AUTO: 1.5 %
ERYTHROCYTE [DISTWIDTH] IN BLOOD BY AUTOMATED COUNT: 12.6 % (ref 11–15)
EST. AVERAGE GLUCOSE BLD GHB EST-MCNC: 114 MG/DL
EST. AVERAGE GLUCOSE BLD GHB EST-SCNC: 6.3 MMOL/L
FERRITIN SERPL-MCNC: 45 NG/ML (ref 16–232)
FOLATE SERPL-MCNC: 7.7 NG/ML
GLUCOSE SERPL-MCNC: 105 MG/DL (ref 65–99)
HBA1C MFR BLD: 5.6 % OF TOTAL HGB
HCT VFR BLD AUTO: 36.8 % (ref 35–45)
HDLC SERPL-MCNC: 112 MG/DL
HGB BLD-MCNC: 12.6 G/DL (ref 11.7–15.5)
IRON SATN MFR SERPL: 24 % (CALC) (ref 16–45)
IRON SERPL-MCNC: 81 MCG/DL (ref 45–160)
LDLC SERPL CALC-MCNC: 87 MG/DL (CALC)
LYMPHOCYTES # BLD AUTO: 1997 CELLS/UL (ref 850–3900)
LYMPHOCYTES NFR BLD AUTO: 29.8 %
MAGNESIUM SERPL-MCNC: 2.1 MG/DL (ref 1.5–2.5)
MCH RBC QN AUTO: 31.6 PG (ref 27–33)
MCHC RBC AUTO-ENTMCNC: 34.2 G/DL (ref 32–36)
MCV RBC AUTO: 92.2 FL (ref 80–100)
MONOCYTES # BLD AUTO: 496 CELLS/UL (ref 200–950)
MONOCYTES NFR BLD AUTO: 7.4 %
NEUTROPHILS # BLD AUTO: 4080 CELLS/UL (ref 1500–7800)
NEUTROPHILS NFR BLD AUTO: 60.9 %
NONHDLC SERPL-MCNC: 102 MG/DL (CALC)
PLATELET # BLD AUTO: 277 THOUSAND/UL (ref 140–400)
PMV BLD REES-ECKER: 9.5 FL (ref 7.5–12.5)
POTASSIUM SERPL-SCNC: 4 MMOL/L (ref 3.5–5.3)
PROT SERPL-MCNC: 6.8 G/DL (ref 6.1–8.1)
RBC # BLD AUTO: 3.99 MILLION/UL (ref 3.8–5.1)
SODIUM SERPL-SCNC: 140 MMOL/L (ref 135–146)
TIBC SERPL-MCNC: 339 MCG/DL (CALC) (ref 250–450)
TRIGL SERPL-MCNC: 67 MG/DL
TSH SERPL-ACNC: 1.19 MIU/L (ref 0.4–4.5)
VIT B12 SERPL-MCNC: 416 PG/ML (ref 200–1100)
WBC # BLD AUTO: 6.7 THOUSAND/UL (ref 3.8–10.8)

## 2025-04-12 DIAGNOSIS — I10 ESSENTIAL HYPERTENSION: ICD-10-CM

## 2025-04-12 DIAGNOSIS — E78.2 HYPERCHOLESTEROLEMIA WITH HYPERTRIGLYCERIDEMIA: ICD-10-CM

## 2025-04-12 DIAGNOSIS — E55.9 VITAMIN D DEFICIENCY: ICD-10-CM

## 2025-04-14 RX ORDER — LOSARTAN POTASSIUM 25 MG/1
TABLET ORAL
Qty: 100 TABLET | Refills: 0 | Status: SHIPPED | OUTPATIENT
Start: 2025-04-14

## 2025-04-14 RX ORDER — ATORVASTATIN CALCIUM 10 MG/1
10 TABLET, FILM COATED ORAL DAILY
Qty: 90 TABLET | Refills: 0 | Status: SHIPPED | OUTPATIENT
Start: 2025-04-14

## 2025-04-14 RX ORDER — VIT C/E/ZN/COPPR/LUTEIN/ZEAXAN 250MG-90MG
CAPSULE ORAL
Qty: 90 CAPSULE | Refills: 0 | Status: SHIPPED | OUTPATIENT
Start: 2025-04-14

## 2025-05-30 ENCOUNTER — APPOINTMENT (OUTPATIENT)
Dept: OBSTETRICS AND GYNECOLOGY | Facility: CLINIC | Age: 60
End: 2025-05-30
Payer: COMMERCIAL

## 2025-05-30 VITALS — DIASTOLIC BLOOD PRESSURE: 86 MMHG | SYSTOLIC BLOOD PRESSURE: 132 MMHG | BODY MASS INDEX: 28.32 KG/M2 | WEIGHT: 155.9 LBS

## 2025-05-30 DIAGNOSIS — Z79.890 HORMONE REPLACEMENT THERAPY: ICD-10-CM

## 2025-05-30 DIAGNOSIS — R23.2 VASOMOTOR FLUSHING: ICD-10-CM

## 2025-05-30 DIAGNOSIS — Z01.419 ENCNTR FOR GYN EXAM (GENERAL) (ROUTINE) W/O ABN FINDINGS: Primary | ICD-10-CM

## 2025-05-30 DIAGNOSIS — Z12.4 PAP SMEAR FOR CERVICAL CANCER SCREENING: ICD-10-CM

## 2025-05-30 DIAGNOSIS — N39.41 URGE INCONTINENCE: ICD-10-CM

## 2025-05-30 DIAGNOSIS — N95.1 VASOMOTOR SYMPTOMS DUE TO MENOPAUSE: ICD-10-CM

## 2025-05-30 PROCEDURE — 99396 PREV VISIT EST AGE 40-64: CPT | Performed by: ADVANCED PRACTICE MIDWIFE

## 2025-05-30 PROCEDURE — 87626 HPV SEP HI-RSK TYP&POOL RSLT: CPT

## 2025-05-30 PROCEDURE — 3075F SYST BP GE 130 - 139MM HG: CPT | Performed by: ADVANCED PRACTICE MIDWIFE

## 2025-05-30 PROCEDURE — 3079F DIAST BP 80-89 MM HG: CPT | Performed by: ADVANCED PRACTICE MIDWIFE

## 2025-05-30 PROCEDURE — 1036F TOBACCO NON-USER: CPT | Performed by: ADVANCED PRACTICE MIDWIFE

## 2025-05-30 RX ORDER — OXYBUTYNIN CHLORIDE 10 MG/1
10 TABLET, EXTENDED RELEASE ORAL DAILY
Qty: 90 TABLET | Refills: 3 | Status: SHIPPED | OUTPATIENT
Start: 2025-05-30

## 2025-05-30 RX ORDER — ESTRADIOL 0.03 MG/D
1 PATCH TRANSDERMAL
Qty: 4 PATCH | Refills: 12 | Status: SHIPPED | OUTPATIENT
Start: 2025-06-01

## 2025-05-30 RX ORDER — PROGESTERONE 100 MG/1
100 CAPSULE ORAL NIGHTLY
Qty: 90 CAPSULE | Refills: 4 | Status: SHIPPED | OUTPATIENT
Start: 2025-05-30

## 2025-05-30 NOTE — PROGRESS NOTES
GYNECOLOGY PROGRESS NOTE        CC:  See below. No issues or concerns. Doing well on Rxs. Needs refills. Normal mammogram 1/2025. No bleeding. No c/o vaginal discharge or itching. Using HRT and doing well with the Rxs. Last pap 2021 wnl and negative HPV, patient requests pap today.   Chief Complaint   Patient presents with    Annual Exam     Est. Patient  PAP 2021, Medication refill  KEVIN 2025        HPI:  Gwen Nice is here for a routine GYN examination.  No GYN c/o, no AUB.            ROS:  GI - no blood in BMs  URO - no hematuria  GYN - no AUB or vaginal discharge  PSYCH - mood OK        PHYSICAL EXAM:  /86   Wt 70.7 kg (155 lb 14.4 oz)   BMI 28.32 kg/m²   GEN:  A&O, NAD  URO:  normal urethra, no bladder TTP  GYN:  normal vulva and perineum w/o lesions or ulcers, normal vagina without discharge or lesions, normal cervix without lesions or discharge or CMT, uterus NT/NE, adnexa mobile and NT/NE  BREAST:  normal mammogram 1/2025  PSYCH:  normal affect, non-anxious      IMPRESSION/PLAN:  A:normal gyn exam. No issues or concerns with HRT, it is working well. No issues with Ditropan-XL.  Mammogram 1/31/2025 Cat #1  Plan: 1. Pap today. 2. Mammogram due after 1/2026. 3. Refilled Climara 0.025mg Q week. 4. Refilled Prometrium 100mg every day. 5. Refilled Ditropan-Xl every day. 6. Follow up 1 year or prn.  Problem List Items Addressed This Visit       Urge incontinence    Relevant Medications    oxyBUTYnin XL (Ditropan-XL) 10 mg 24 hr tablet     Other Visit Diagnoses         Pap smear for cervical cancer screening    -  Primary    Relevant Orders    THINPREP PAP TEST (>30)      Vasomotor flushing        Relevant Medications    estradiol (Climara) 0.025 mg/24 hr patch (Start on 6/1/2025)      Vasomotor symptoms due to menopause        Relevant Medications    progesterone (Prometrium) 100 mg capsule      Hormone replacement therapy        Relevant Medications    progesterone (Prometrium) 100 mg capsule             Pap and HPV normal in 2021, no Hx of HGSIL.   ASCCP pap smear screening guidelines reviewed with the patient.    Mammogram up to date and normal.        RACHEL Sargent-MICHELE

## 2025-06-12 LAB
CYTOLOGY CMNT CVX/VAG CYTO-IMP: NORMAL
HPV HR 12 DNA GENITAL QL NAA+PROBE: NEGATIVE
HPV HR GENOTYPES PNL CVX NAA+PROBE: NEGATIVE
HPV16 DNA SPEC QL NAA+PROBE: NEGATIVE
HPV18 DNA SPEC QL NAA+PROBE: NEGATIVE
LAB AP HPV GENOTYPE QUESTION: YES
LAB AP HPV HR: NORMAL
LABORATORY COMMENT REPORT: NORMAL
PATH REPORT.TOTAL CANCER: NORMAL

## 2025-07-11 DIAGNOSIS — F41.8 DEPRESSION WITH ANXIETY: ICD-10-CM

## 2025-07-11 DIAGNOSIS — I10 ESSENTIAL HYPERTENSION: ICD-10-CM

## 2025-07-11 DIAGNOSIS — K21.9 GASTROESOPHAGEAL REFLUX DISEASE, UNSPECIFIED WHETHER ESOPHAGITIS PRESENT: ICD-10-CM

## 2025-07-11 RX ORDER — AMLODIPINE BESYLATE 10 MG/1
10 TABLET ORAL DAILY
Qty: 90 TABLET | Refills: 0 | Status: SHIPPED | OUTPATIENT
Start: 2025-07-11

## 2025-07-11 RX ORDER — CITALOPRAM 10 MG/1
10 TABLET ORAL DAILY
Qty: 90 TABLET | Refills: 0 | Status: SHIPPED | OUTPATIENT
Start: 2025-07-11

## 2025-07-11 RX ORDER — OMEPRAZOLE 40 MG/1
CAPSULE, DELAYED RELEASE ORAL
Qty: 90 CAPSULE | Refills: 0 | Status: SHIPPED | OUTPATIENT
Start: 2025-07-11

## 2025-08-15 ENCOUNTER — HOSPITAL ENCOUNTER (EMERGENCY)
Facility: HOSPITAL | Age: 60
Discharge: HOME | End: 2025-08-15
Payer: COMMERCIAL

## 2025-08-15 VITALS
TEMPERATURE: 97.9 F | SYSTOLIC BLOOD PRESSURE: 140 MMHG | HEART RATE: 84 BPM | WEIGHT: 150 LBS | DIASTOLIC BLOOD PRESSURE: 93 MMHG | HEIGHT: 61 IN | RESPIRATION RATE: 16 BRPM | BODY MASS INDEX: 28.32 KG/M2 | OXYGEN SATURATION: 97 %

## 2025-08-15 DIAGNOSIS — I10 ESSENTIAL HYPERTENSION: ICD-10-CM

## 2025-08-15 DIAGNOSIS — S80.811A ABRASION OF SKIN OF RIGHT LOWER LEG: Primary | ICD-10-CM

## 2025-08-15 PROCEDURE — 99281 EMR DPT VST MAYX REQ PHY/QHP: CPT

## 2025-08-15 ASSESSMENT — LIFESTYLE VARIABLES
EVER HAD A DRINK FIRST THING IN THE MORNING TO STEADY YOUR NERVES TO GET RID OF A HANGOVER: NO
HAVE YOU EVER FELT YOU SHOULD CUT DOWN ON YOUR DRINKING: NO
EVER FELT BAD OR GUILTY ABOUT YOUR DRINKING: NO
HAVE PEOPLE ANNOYED YOU BY CRITICIZING YOUR DRINKING: NO
TOTAL SCORE: 0

## 2025-08-15 ASSESSMENT — PAIN SCALES - GENERAL: PAINLEVEL_OUTOF10: 0 - NO PAIN

## 2025-08-15 ASSESSMENT — PAIN - FUNCTIONAL ASSESSMENT: PAIN_FUNCTIONAL_ASSESSMENT: 0-10

## 2025-08-17 RX ORDER — AMLODIPINE BESYLATE 10 MG/1
10 TABLET ORAL DAILY
Qty: 90 TABLET | Refills: 0 | Status: SHIPPED | OUTPATIENT
Start: 2025-08-17

## 2025-08-17 RX ORDER — LOSARTAN POTASSIUM 25 MG/1
TABLET ORAL
Qty: 100 TABLET | Refills: 0 | Status: SHIPPED | OUTPATIENT
Start: 2025-08-17

## 2025-08-19 ENCOUNTER — OFFICE VISIT (OUTPATIENT)
Dept: ORTHOPEDIC SURGERY | Facility: CLINIC | Age: 60
End: 2025-08-19
Payer: COMMERCIAL

## 2025-08-19 ENCOUNTER — HOSPITAL ENCOUNTER (OUTPATIENT)
Dept: RADIOLOGY | Facility: CLINIC | Age: 60
Discharge: HOME | End: 2025-08-19
Payer: COMMERCIAL

## 2025-08-19 DIAGNOSIS — M54.16 LUMBAR RADICULOPATHY: ICD-10-CM

## 2025-08-19 DIAGNOSIS — M54.16 LUMBAR RADICULOPATHY: Primary | ICD-10-CM

## 2025-08-19 PROCEDURE — 99215 OFFICE O/P EST HI 40 MIN: CPT | Performed by: ORTHOPAEDIC SURGERY

## 2025-08-19 PROCEDURE — 99212 OFFICE O/P EST SF 10 MIN: CPT | Performed by: ORTHOPAEDIC SURGERY

## 2025-08-19 PROCEDURE — 72110 X-RAY EXAM L-2 SPINE 4/>VWS: CPT

## 2025-08-31 DIAGNOSIS — E55.9 VITAMIN D DEFICIENCY: ICD-10-CM

## 2025-09-04 RX ORDER — VIT C/E/ZN/COPPR/LUTEIN/ZEAXAN 250MG-90MG
CAPSULE ORAL
Qty: 90 CAPSULE | Refills: 0 | Status: SHIPPED | OUTPATIENT
Start: 2025-09-04

## 2025-09-15 ENCOUNTER — APPOINTMENT (OUTPATIENT)
Dept: RADIOLOGY | Facility: HOSPITAL | Age: 60
End: 2025-09-15
Payer: COMMERCIAL

## 2025-09-19 ENCOUNTER — APPOINTMENT (OUTPATIENT)
Dept: PRIMARY CARE | Facility: CLINIC | Age: 60
End: 2025-09-19
Payer: COMMERCIAL

## (undated) DEVICE — STRIP, SKIN CLOSURE, STERI STRIP, REINFORCED, 0.5 X 4 IN

## (undated) DEVICE — SOLUTION, TOPICAL, ALCOHOL, ISOPROPYL 70%, 16 OZ

## (undated) DEVICE — MANIFOLD, 4 PORT NEPTUNE STANDARD

## (undated) DEVICE — GOWN, SURGICAL, IMPLT, BACK, XLARGE, XLONG, STERILE

## (undated) DEVICE — Device

## (undated) DEVICE — TUBING, PATIENT 8FT STERILE

## (undated) DEVICE — SUTURE, VICRYL, 2-0, 27 IN, X-1, UNDYED

## (undated) DEVICE — TUBING, SUCTION, 6MM X 10, CLEAN N-COND

## (undated) DEVICE — PROBE, SERFAS, 3.5MM, 50 S, ENERGY SUCTION SYSTEM

## (undated) DEVICE — MAT, FLOOR, STANDARD FLUID BARRIER, 32X44, GREEN

## (undated) DEVICE — SUTURE, VICRYL, 1, 27 IN, CP, VIOLET

## (undated) DEVICE — TUBING, PUMP REDEUCE 8FT STERILE

## (undated) DEVICE — HANDLE, LITE EZ, PLASTIC, DISP, LF

## (undated) DEVICE — COVER, MAYO STAND, W/PAD, 23 IN, DISPOSABLE, PLASTIC, LF, STERILE

## (undated) DEVICE — DRESSING, GAUZE, SPONGE, 12 PLY, 4 X 4 IN, PLASTIC POUCH, STRL 10PK

## (undated) DEVICE — PADDING, CAST, SPECIALIST, 6 IN X 4 YD, STERILE

## (undated) DEVICE — PREP, IODOPHOR, W/ALCOHOL, DURAPREP, W/APPLICATOR, 26 CC

## (undated) DEVICE — GLOVE, SURGICAL, PROTEXIS PI BLUE W/NEUTHERA, 8.0, PF, LF

## (undated) DEVICE — TOWELS 4-PK

## (undated) DEVICE — DRESSING, ABDOMINAL PAD, CURITY, 7.5 X 8 IN

## (undated) DEVICE — SOLUTION, IRRIGATION, USP, SODIUM CHLORIDE 0.9%, 3000 ML

## (undated) DEVICE — BANDAGE, ELASTIC, 6 X 10YD, BEIGE, LF

## (undated) DEVICE — STRAP, VELCRO, BODY, 4 X 60IN, NS

## (undated) DEVICE — TOWEL PACK, STERILE, 16X24, XRAY DETECTABLE, BLUE, 4/PK

## (undated) DEVICE — STRAP, ARM BOARD, 32 X 1.5

## (undated) DEVICE — DRAPE, SHEET, THREE QUARTER, FAN FOLD, 57 X 77 IN

## (undated) DEVICE — GLOVE, SURGICAL, ORTHO, PROTEXIS, HYDROGEL, 8.0, PF, LATEX

## (undated) DEVICE — BANDAGE, COFLEX, 4 X 5 YDS, FOAM TAN, STERILE, LF